# Patient Record
Sex: FEMALE | Race: OTHER | HISPANIC OR LATINO
[De-identification: names, ages, dates, MRNs, and addresses within clinical notes are randomized per-mention and may not be internally consistent; named-entity substitution may affect disease eponyms.]

---

## 2017-09-08 ENCOUNTER — APPOINTMENT (OUTPATIENT)
Dept: SURGERY | Facility: CLINIC | Age: 77
End: 2017-09-08

## 2017-09-15 ENCOUNTER — OTHER (OUTPATIENT)
Age: 77
End: 2017-09-15

## 2017-09-15 ENCOUNTER — APPOINTMENT (OUTPATIENT)
Dept: SURGERY | Facility: CLINIC | Age: 77
End: 2017-09-15
Payer: MEDICARE

## 2017-09-15 VITALS — HEIGHT: 64 IN | BODY MASS INDEX: 35.34 KG/M2 | WEIGHT: 207 LBS

## 2017-09-15 DIAGNOSIS — Z78.9 OTHER SPECIFIED HEALTH STATUS: ICD-10-CM

## 2017-09-15 DIAGNOSIS — F17.200 NICOTINE DEPENDENCE, UNSPECIFIED, UNCOMPLICATED: ICD-10-CM

## 2017-09-15 PROCEDURE — 99213 OFFICE O/P EST LOW 20 MIN: CPT

## 2017-10-06 VITALS
WEIGHT: 199.96 LBS | TEMPERATURE: 97 F | RESPIRATION RATE: 18 BRPM | HEIGHT: 64 IN | SYSTOLIC BLOOD PRESSURE: 182 MMHG | OXYGEN SATURATION: 97 % | DIASTOLIC BLOOD PRESSURE: 82 MMHG | HEART RATE: 65 BPM

## 2017-10-06 RX ORDER — INFLUENZA VIRUS VACCINE 15; 15; 15; 15 UG/.5ML; UG/.5ML; UG/.5ML; UG/.5ML
0.5 SUSPENSION INTRAMUSCULAR ONCE
Qty: 0 | Refills: 0 | Status: DISCONTINUED | OUTPATIENT
Start: 2017-10-09 | End: 2017-10-13

## 2017-10-06 RX ORDER — DICLOFENAC SODIUM 75 MG/1
0 TABLET, DELAYED RELEASE ORAL
Qty: 0 | Refills: 0 | COMMUNITY

## 2017-10-06 NOTE — PATIENT PROFILE ADULT. - PSH
S/P appendectomy    S/P     S/P cholecystectomy    S/P knee replacement  left  S/P tonsillectomy    Surgery, elective  Right shoulder replacement S/P appendectomy    S/P     S/P cholecystectomy    S/P knee replacement  left  S/P tonsillectomy    Surgery, elective  Right shoulder surgery

## 2017-10-09 ENCOUNTER — INPATIENT (INPATIENT)
Facility: HOSPITAL | Age: 77
LOS: 3 days | Discharge: HOME | DRG: 328 | End: 2017-10-13
Attending: SURGERY | Admitting: SURGERY
Payer: MEDICARE

## 2017-10-09 ENCOUNTER — RESULT REVIEW (OUTPATIENT)
Age: 77
End: 2017-10-09

## 2017-10-09 DIAGNOSIS — D3A.00 BENIGN CARCINOID TUMOR OF UNSPECIFIED SITE: ICD-10-CM

## 2017-10-09 DIAGNOSIS — Z96.659 PRESENCE OF UNSPECIFIED ARTIFICIAL KNEE JOINT: Chronic | ICD-10-CM

## 2017-10-09 DIAGNOSIS — Z41.9 ENCOUNTER FOR PROCEDURE FOR PURPOSES OTHER THAN REMEDYING HEALTH STATE, UNSPECIFIED: Chronic | ICD-10-CM

## 2017-10-09 DIAGNOSIS — Z90.49 ACQUIRED ABSENCE OF OTHER SPECIFIED PARTS OF DIGESTIVE TRACT: Chronic | ICD-10-CM

## 2017-10-09 DIAGNOSIS — Z98.891 HISTORY OF UTERINE SCAR FROM PREVIOUS SURGERY: Chronic | ICD-10-CM

## 2017-10-09 DIAGNOSIS — Z90.89 ACQUIRED ABSENCE OF OTHER ORGANS: Chronic | ICD-10-CM

## 2017-10-09 LAB
BASE EXCESS BLDA CALC-SCNC: -3.9 MMOL/L — LOW (ref -2–3)
CA-I BLDA-SCNC: 1.13 MMOL/L — SIGNIFICANT CHANGE UP (ref 1.12–1.3)
COHGB MFR BLDA: 0.5 % — SIGNIFICANT CHANGE UP
GAS PNL BLDA: SIGNIFICANT CHANGE UP
HCO3 BLDA-SCNC: 21 MMOL/L — SIGNIFICANT CHANGE UP (ref 21–28)
HCT VFR BLD CALC: 36.6 % — SIGNIFICANT CHANGE UP (ref 34.5–45)
HGB BLD-MCNC: 11.8 G/DL — SIGNIFICANT CHANGE UP (ref 11.5–15.5)
HGB BLDA-MCNC: 12.5 G/DL — SIGNIFICANT CHANGE UP (ref 11.5–15.5)
MCHC RBC-ENTMCNC: 30.2 PG — SIGNIFICANT CHANGE UP (ref 27–34)
MCHC RBC-ENTMCNC: 32.2 G/DL — SIGNIFICANT CHANGE UP (ref 32–36)
MCV RBC AUTO: 93.6 FL — SIGNIFICANT CHANGE UP (ref 80–100)
METHGB MFR BLDA: 0.2 % — SIGNIFICANT CHANGE UP
O2 CT VFR BLDA CALC: 18.1 ML/DL — SIGNIFICANT CHANGE UP (ref 15–23)
OXYHGB MFR BLDA: 99 % — SIGNIFICANT CHANGE UP (ref 94–100)
PCO2 BLDA: 40 MMHG — SIGNIFICANT CHANGE UP (ref 32–45)
PH BLDA: 7.35 — SIGNIFICANT CHANGE UP (ref 7.35–7.45)
PLATELET # BLD AUTO: 250 K/UL — SIGNIFICANT CHANGE UP (ref 150–400)
PO2 BLDA: 303 MMHG — HIGH (ref 83–108)
POTASSIUM BLDA-SCNC: 3.8 MMOL/L — SIGNIFICANT CHANGE UP (ref 3.5–4.9)
RBC # BLD: 3.91 M/UL — SIGNIFICANT CHANGE UP (ref 3.8–5.2)
RBC # FLD: 14.7 % — SIGNIFICANT CHANGE UP (ref 10.3–16.9)
SAO2 % BLDA: 100 % — SIGNIFICANT CHANGE UP (ref 95–100)
SODIUM BLDA-SCNC: 135 MMOL/L — LOW (ref 138–146)
WBC # BLD: 16.4 K/UL — HIGH (ref 3.8–10.5)
WBC # FLD AUTO: 16.4 K/UL — HIGH (ref 3.8–10.5)

## 2017-10-09 PROCEDURE — 43631 REMOVAL OF STOMACH PARTIAL: CPT | Mod: GC

## 2017-10-09 RX ORDER — PANTOPRAZOLE SODIUM 20 MG/1
40 TABLET, DELAYED RELEASE ORAL DAILY
Qty: 0 | Refills: 0 | Status: DISCONTINUED | OUTPATIENT
Start: 2017-10-09 | End: 2017-10-11

## 2017-10-09 RX ORDER — HYDROMORPHONE HYDROCHLORIDE 2 MG/ML
1 INJECTION INTRAMUSCULAR; INTRAVENOUS; SUBCUTANEOUS
Qty: 0 | Refills: 0 | Status: COMPLETED | OUTPATIENT
Start: 2017-10-09 | End: 2017-10-09

## 2017-10-09 RX ORDER — SCOPALAMINE 1 MG/3D
1.5 PATCH, EXTENDED RELEASE TRANSDERMAL ONCE
Qty: 0 | Refills: 0 | Status: COMPLETED | OUTPATIENT
Start: 2017-10-09 | End: 2017-10-09

## 2017-10-09 RX ORDER — LEVOTHYROXINE SODIUM 125 MCG
1 TABLET ORAL
Qty: 0 | Refills: 0 | COMMUNITY

## 2017-10-09 RX ORDER — ENOXAPARIN SODIUM 100 MG/ML
40 INJECTION SUBCUTANEOUS ONCE
Qty: 0 | Refills: 0 | Status: COMPLETED | OUTPATIENT
Start: 2017-10-09 | End: 2017-10-09

## 2017-10-09 RX ORDER — ACETAMINOPHEN 500 MG
1000 TABLET ORAL ONCE
Qty: 0 | Refills: 0 | Status: COMPLETED | OUTPATIENT
Start: 2017-10-10 | End: 2017-10-10

## 2017-10-09 RX ORDER — LEVOTHYROXINE SODIUM 125 MCG
62 TABLET ORAL DAILY
Qty: 0 | Refills: 0 | Status: DISCONTINUED | OUTPATIENT
Start: 2017-10-09 | End: 2017-10-11

## 2017-10-09 RX ORDER — HYDROMORPHONE HYDROCHLORIDE 2 MG/ML
0.5 INJECTION INTRAMUSCULAR; INTRAVENOUS; SUBCUTANEOUS EVERY 4 HOURS
Qty: 0 | Refills: 0 | Status: DISCONTINUED | OUTPATIENT
Start: 2017-10-09 | End: 2017-10-13

## 2017-10-09 RX ORDER — NEBIVOLOL HYDROCHLORIDE 5 MG/1
1 TABLET ORAL
Qty: 0 | Refills: 0 | COMMUNITY

## 2017-10-09 RX ORDER — SODIUM CHLORIDE 9 MG/ML
1000 INJECTION, SOLUTION INTRAVENOUS
Qty: 0 | Refills: 0 | Status: DISCONTINUED | OUTPATIENT
Start: 2017-10-09 | End: 2017-10-11

## 2017-10-09 RX ORDER — ONDANSETRON 8 MG/1
4 TABLET, FILM COATED ORAL EVERY 6 HOURS
Qty: 0 | Refills: 0 | Status: DISCONTINUED | OUTPATIENT
Start: 2017-10-09 | End: 2017-10-13

## 2017-10-09 RX ORDER — HYDRALAZINE HCL 50 MG
10 TABLET ORAL ONCE
Qty: 0 | Refills: 0 | Status: COMPLETED | OUTPATIENT
Start: 2017-10-09 | End: 2017-10-09

## 2017-10-09 RX ORDER — ENOXAPARIN SODIUM 100 MG/ML
40 INJECTION SUBCUTANEOUS
Qty: 0 | Refills: 0 | Status: DISCONTINUED | OUTPATIENT
Start: 2017-10-10 | End: 2017-10-13

## 2017-10-09 RX ORDER — ACETAMINOPHEN 500 MG
1000 TABLET ORAL ONCE
Qty: 0 | Refills: 0 | Status: COMPLETED | OUTPATIENT
Start: 2017-10-09 | End: 2017-10-09

## 2017-10-09 RX ORDER — SITAGLIPTIN 50 MG/1
1 TABLET, FILM COATED ORAL
Qty: 0 | Refills: 0 | COMMUNITY

## 2017-10-09 RX ORDER — LABETALOL HCL 100 MG
10 TABLET ORAL ONCE
Qty: 0 | Refills: 0 | Status: COMPLETED | OUTPATIENT
Start: 2017-10-09 | End: 2017-10-09

## 2017-10-09 RX ORDER — HYDROMORPHONE HYDROCHLORIDE 2 MG/ML
1 INJECTION INTRAMUSCULAR; INTRAVENOUS; SUBCUTANEOUS EVERY 4 HOURS
Qty: 0 | Refills: 0 | Status: DISCONTINUED | OUTPATIENT
Start: 2017-10-09 | End: 2017-10-13

## 2017-10-09 RX ORDER — ACETAMINOPHEN 500 MG
1000 TABLET ORAL ONCE
Qty: 0 | Refills: 0 | Status: COMPLETED | OUTPATIENT
Start: 2017-10-09 | End: 2017-10-10

## 2017-10-09 RX ADMIN — Medication 1000 MILLIGRAM(S): at 23:15

## 2017-10-09 RX ADMIN — SODIUM CHLORIDE 150 MILLILITER(S): 9 INJECTION, SOLUTION INTRAVENOUS at 17:33

## 2017-10-09 RX ADMIN — Medication 10 MILLIGRAM(S): at 18:40

## 2017-10-09 RX ADMIN — HYDROMORPHONE HYDROCHLORIDE 1 MILLIGRAM(S): 2 INJECTION INTRAMUSCULAR; INTRAVENOUS; SUBCUTANEOUS at 17:15

## 2017-10-09 RX ADMIN — Medication 400 MILLIGRAM(S): at 22:39

## 2017-10-09 RX ADMIN — SCOPALAMINE 1.5 MILLIGRAM(S): 1 PATCH, EXTENDED RELEASE TRANSDERMAL at 06:57

## 2017-10-09 RX ADMIN — HYDROMORPHONE HYDROCHLORIDE 1 MILLIGRAM(S): 2 INJECTION INTRAMUSCULAR; INTRAVENOUS; SUBCUTANEOUS at 17:00

## 2017-10-09 RX ADMIN — Medication 10 MILLIGRAM(S): at 16:15

## 2017-10-09 RX ADMIN — PANTOPRAZOLE SODIUM 40 MILLIGRAM(S): 20 TABLET, DELAYED RELEASE ORAL at 17:33

## 2017-10-09 RX ADMIN — ENOXAPARIN SODIUM 40 MILLIGRAM(S): 100 INJECTION SUBCUTANEOUS at 06:57

## 2017-10-09 NOTE — H&P ADULT - HISTORY OF PRESENT ILLNESS
Patient is an 77 yo F with PMH significant for DM, HTN, hypothyroidism, & recently diagnosed gastric carcinoid found on routine endoscopy, patient is currently asymptomatic. Work-up revealed a well differentiated gastric carcinoid approximately 1.2cm in diameter in gastric antrum.

## 2017-10-09 NOTE — CHART NOTE - NSCHARTNOTEFT_GEN_A_CORE
STATUS POST:  Subtotal gastrectomy    SUBJECTIVE: Pt seen in PACU, complain of pain but tolerable, no nausea/vomiting. no headache. no palpitations    Vital Signs Last 24 Hrs  T(C): 36.8 (09 Oct 2017 15:18), Max: 36.8 (09 Oct 2017 15:18)  T(F): 98.3 (09 Oct 2017 15:18), Max: 98.3 (09 Oct 2017 15:18)  HR: 68 (09 Oct 2017 17:30) (66 - 82)  BP: 161/83 (09 Oct 2017 17:30) (153/82 - 221/100)  BP(mean): 119 (09 Oct 2017 17:30) (119 - 148)  RR: 16 (09 Oct 2017 17:30) (16 - 31)  SpO2: 100% (09 Oct 2017 17:30) (98% - 100%)  I&O's Summary    09 Oct 2017 07:01  -  09 Oct 2017 17:55  --------------------------------------------------------  IN: 3800 mL / OUT: 875 mL / NET: 2925 mL      I&O's Detail    09 Oct 2017 07:01  -  09 Oct 2017 17:55  --------------------------------------------------------  IN:    lactated ringers.: 300 mL    Other: 3500 mL  Total IN: 3800 mL    OUT:    Bulb: 50 mL    Estimated Blood Loss: 100 mL    Indwelling Catheter - Urethral: 725 mL  Total OUT: 875 mL    Total NET: 2925 m          Physical exam : Abdomen is soft, non tender, distended,       A/P: 76y Female   - Diet: NPO/IVF  - Activity: OOTB/ISS  - Labs: 6hr post op CBC and AM labs  - Pain medication  - DVT ppx STATUS POST:  Subtotal gastrectomy    SUBJECTIVE: Pt seen in PACU, complain of pain but tolerable, no nausea/vomiting. no headache. no palpitations, drowsy    Vital Signs Last 24 Hrs  T(C): 36.8 (09 Oct 2017 15:18), Max: 36.8 (09 Oct 2017 15:18)  T(F): 98.3 (09 Oct 2017 15:18), Max: 98.3 (09 Oct 2017 15:18)  HR: 68 (09 Oct 2017 17:30) (66 - 82)  BP: 161/83 (09 Oct 2017 17:30) (153/82 - 221/100)  BP(mean): 119 (09 Oct 2017 17:30) (119 - 148)  RR: 16 (09 Oct 2017 17:30) (16 - 31)  SpO2: 100% (09 Oct 2017 17:30) (98% - 100%)  I&O's Summary    09 Oct 2017 07:01  -  09 Oct 2017 17:55  --------------------------------------------------------  IN: 3800 mL / OUT: 875 mL / NET: 2925 mL      I&O's Detail    09 Oct 2017 07:01  -  09 Oct 2017 17:55  --------------------------------------------------------  IN:    lactated ringers.: 300 mL    Other: 3500 mL  Total IN: 3800 mL    OUT:    Bulb: 50 mL    Estimated Blood Loss: 100 mL    Indwelling Catheter - Urethral: 725 mL  Total OUT: 875 mL    Total NET: 2925 m          Physical exam : Abdomen is soft, non tender, non distended, incisions C/D/I, BHANU drain intact      A/P: 76y Female   - Diet: NPO/IVF  - Activity: OOTB/ISS  - Labs: 6hr post op CBC and AM labs  - Pain medication  - DVT ppx  - UGI in AM

## 2017-10-09 NOTE — BRIEF OPERATIVE NOTE - PROCEDURE
<<-----Click on this checkbox to enter Procedure Lymphadenectomy, abdominal  10/09/2017  D2  Active  MACHO  Ventral hernia repair  10/09/2017    Active  AGODWIN  Hiatal hernia repair  10/09/2017    Active  AGODWIN  Subtotal gastrectomy  10/09/2017  Robotic assisted  Active  AGODWIN

## 2017-10-09 NOTE — BRIEF OPERATIVE NOTE - POST-OP DX
Carcinoid tumor of stomach  10/09/2017    Parth Lozano  Hiatal hernia  10/09/2017    Parth Lozano  Ventral hernia without obstruction or gangrene  10/09/2017    Parth Lozano

## 2017-10-09 NOTE — H&P ADULT - PROBLEM SELECTOR PLAN 1
- NPO/IVFs  - Lovenox 40mg operative DVT ppx  - Consent for subtotal gastrectomy to be obtained by Dr. Gasca at bedside

## 2017-10-09 NOTE — BRIEF OPERATIVE NOTE - OPERATION/FINDINGS
Patient underwent laparoscopic robotic assisted subtotal gastrectomy with a D2 lymphadenectomy and a RNY reconstruction. Stapled side-to-side functional end-to-end gastrojejunal anastomosis and stapled side-to-side functional end-to-end jejunal-jejunal anastomosis without and tension and excellent blood supply. Patient also underwent primary repair of hiatal & incisional hernias without and complications.

## 2017-10-09 NOTE — H&P ADULT - PSH
S/P appendectomy    S/P     S/P cholecystectomy    S/P knee replacement  left  S/P tonsillectomy    Surgery, elective  Right shoulder surgery

## 2017-10-10 LAB
ANION GAP SERPL CALC-SCNC: 11 MMOL/L — SIGNIFICANT CHANGE UP (ref 5–17)
BUN SERPL-MCNC: 11 MG/DL — SIGNIFICANT CHANGE UP (ref 7–23)
CALCIUM SERPL-MCNC: 8.8 MG/DL — SIGNIFICANT CHANGE UP (ref 8.4–10.5)
CHLORIDE SERPL-SCNC: 105 MMOL/L — SIGNIFICANT CHANGE UP (ref 96–108)
CO2 SERPL-SCNC: 26 MMOL/L — SIGNIFICANT CHANGE UP (ref 22–31)
CREAT SERPL-MCNC: 0.81 MG/DL — SIGNIFICANT CHANGE UP (ref 0.5–1.3)
GLUCOSE SERPL-MCNC: 95 MG/DL — SIGNIFICANT CHANGE UP (ref 70–99)
HCT VFR BLD CALC: 32.6 % — LOW (ref 34.5–45)
HGB BLD-MCNC: 10.3 G/DL — LOW (ref 11.5–15.5)
MAGNESIUM SERPL-MCNC: 1.9 MG/DL — SIGNIFICANT CHANGE UP (ref 1.6–2.6)
MCHC RBC-ENTMCNC: 29.7 PG — SIGNIFICANT CHANGE UP (ref 27–34)
MCHC RBC-ENTMCNC: 31.6 G/DL — LOW (ref 32–36)
MCV RBC AUTO: 93.9 FL — SIGNIFICANT CHANGE UP (ref 80–100)
PHOSPHATE SERPL-MCNC: 3.9 MG/DL — SIGNIFICANT CHANGE UP (ref 2.5–4.5)
PLATELET # BLD AUTO: 207 K/UL — SIGNIFICANT CHANGE UP (ref 150–400)
POTASSIUM SERPL-MCNC: 4.4 MMOL/L — SIGNIFICANT CHANGE UP (ref 3.5–5.3)
POTASSIUM SERPL-SCNC: 4.4 MMOL/L — SIGNIFICANT CHANGE UP (ref 3.5–5.3)
RBC # BLD: 3.47 M/UL — LOW (ref 3.8–5.2)
RBC # FLD: 14.7 % — SIGNIFICANT CHANGE UP (ref 10.3–16.9)
SODIUM SERPL-SCNC: 142 MMOL/L — SIGNIFICANT CHANGE UP (ref 135–145)
WBC # BLD: 14 K/UL — HIGH (ref 3.8–10.5)
WBC # FLD AUTO: 14 K/UL — HIGH (ref 3.8–10.5)

## 2017-10-10 PROCEDURE — 74241: CPT | Mod: 26

## 2017-10-10 RX ORDER — NEBIVOLOL HYDROCHLORIDE 5 MG/1
5 TABLET ORAL DAILY
Qty: 0 | Refills: 0 | Status: DISCONTINUED | OUTPATIENT
Start: 2017-10-10 | End: 2017-10-13

## 2017-10-10 RX ADMIN — Medication 1000 MILLIGRAM(S): at 12:23

## 2017-10-10 RX ADMIN — Medication 1000 MILLIGRAM(S): at 05:00

## 2017-10-10 RX ADMIN — HYDROMORPHONE HYDROCHLORIDE 1 MILLIGRAM(S): 2 INJECTION INTRAMUSCULAR; INTRAVENOUS; SUBCUTANEOUS at 22:09

## 2017-10-10 RX ADMIN — SODIUM CHLORIDE 150 MILLILITER(S): 9 INJECTION, SOLUTION INTRAVENOUS at 00:00

## 2017-10-10 RX ADMIN — ONDANSETRON 4 MILLIGRAM(S): 8 TABLET, FILM COATED ORAL at 16:19

## 2017-10-10 RX ADMIN — SODIUM CHLORIDE 150 MILLILITER(S): 9 INJECTION, SOLUTION INTRAVENOUS at 17:13

## 2017-10-10 RX ADMIN — NEBIVOLOL HYDROCHLORIDE 5 MILLIGRAM(S): 5 TABLET ORAL at 18:06

## 2017-10-10 RX ADMIN — ENOXAPARIN SODIUM 40 MILLIGRAM(S): 100 INJECTION SUBCUTANEOUS at 17:13

## 2017-10-10 RX ADMIN — HYDROMORPHONE HYDROCHLORIDE 1 MILLIGRAM(S): 2 INJECTION INTRAMUSCULAR; INTRAVENOUS; SUBCUTANEOUS at 17:20

## 2017-10-10 RX ADMIN — Medication 400 MILLIGRAM(S): at 11:41

## 2017-10-10 RX ADMIN — HYDROMORPHONE HYDROCHLORIDE 1 MILLIGRAM(S): 2 INJECTION INTRAMUSCULAR; INTRAVENOUS; SUBCUTANEOUS at 00:23

## 2017-10-10 RX ADMIN — PANTOPRAZOLE SODIUM 40 MILLIGRAM(S): 20 TABLET, DELAYED RELEASE ORAL at 11:41

## 2017-10-10 RX ADMIN — HYDROMORPHONE HYDROCHLORIDE 1 MILLIGRAM(S): 2 INJECTION INTRAMUSCULAR; INTRAVENOUS; SUBCUTANEOUS at 07:12

## 2017-10-10 RX ADMIN — Medication 62 MICROGRAM(S): at 08:10

## 2017-10-10 RX ADMIN — HYDROMORPHONE HYDROCHLORIDE 1 MILLIGRAM(S): 2 INJECTION INTRAMUSCULAR; INTRAVENOUS; SUBCUTANEOUS at 22:24

## 2017-10-10 RX ADMIN — HYDROMORPHONE HYDROCHLORIDE 1 MILLIGRAM(S): 2 INJECTION INTRAMUSCULAR; INTRAVENOUS; SUBCUTANEOUS at 01:31

## 2017-10-10 RX ADMIN — HYDROMORPHONE HYDROCHLORIDE 1 MILLIGRAM(S): 2 INJECTION INTRAMUSCULAR; INTRAVENOUS; SUBCUTANEOUS at 18:00

## 2017-10-10 RX ADMIN — HYDROMORPHONE HYDROCHLORIDE 1 MILLIGRAM(S): 2 INJECTION INTRAMUSCULAR; INTRAVENOUS; SUBCUTANEOUS at 06:10

## 2017-10-10 RX ADMIN — Medication 400 MILLIGRAM(S): at 04:00

## 2017-10-10 NOTE — DIETITIAN INITIAL EVALUATION ADULT. - ENERGY NEEDS
Height: 5'4" Weight: 200lbs, IBW 120lbs+/-10%, %%, BMI 34.4  IBW used for calculations as pt >120% of IBW   Needs adjusted 2/2 age and surgical incision

## 2017-10-10 NOTE — PROGRESS NOTE ADULT - SUBJECTIVE AND OBJECTIVE BOX
OVERNIGHT EVENTS: postop Hb 11.8, desat to 88 on RA, placed on 2L NC. VSS   10/9 : Underwent lap robotic assisted subtotal gastrectomy with hiatal and ventral hernia repair, and lymphadenectomy. POC WNL    STATUS POST:lap robotic assisted subtotal gastrectomy with RYGB reconstruction, lympadenectomy, hiatal hernia repair and incisional hernia repair.      POST OPERATIVE DAY #:  1    SUBJECTIVE: Patient complains of incisional pain  Flatus: [] YES [X] NO             Bowel Movement: [ ] YES [X ] NO  Pain (0-10):            Pain Control Adequate: [X ] YES [ ] NO  Nausea: [ ] YES [X ] NO            Vomiting: [ ] YES [X ] NO  Diarrhea: [ ] YES [X ] NO         Constipation: [ ] YES [X ] NO     Chest Pain: [ ] YES [X ] NO    SOB:  [ ] YES [X ] NO    enoxaparin Injectable 40 milliGRAM(s) SubCutaneous two times a day      Vital Signs Last 24 Hrs  T(C): 36.5 (10 Oct 2017 09:33), Max: 37.3 (10 Oct 2017 00:12)  T(F): 97.7 (10 Oct 2017 09:33), Max: 99.1 (10 Oct 2017 00:12)  HR: 63 (10 Oct 2017 09:33) (63 - 82)  BP: 136/81 (10 Oct 2017 09:33) (106/64 - 229/100)  BP(mean): 114 (09 Oct 2017 19:00) (114 - 148)  RR: 16 (10 Oct 2017 09:33) (13 - 31)  SpO2: 97% (10 Oct 2017 09:33) (92% - 100%)  I&O's Detail    09 Oct 2017 07:01  -  10 Oct 2017 07:00  --------------------------------------------------------  IN:    lactated ringers.: 2375 mL    Other: 3500 mL  Total IN: 5875 mL    OUT:    Bulb: 110 mL    Estimated Blood Loss: 100 mL    Indwelling Catheter - Urethral: 2175 mL  Total OUT: 2385 mL    Total NET: 3490 mL          General: NAD, resting comfortably in bed  C/V: NSR  Pulm: Nonlabored breathing, no respiratory distress  Abd: Soft , non-distended , TTP around incision site , incision clean dry and intact  Extrem: WWP, no edema, SCDs in place        LABS:                        10.3   14.0  )-----------( 207      ( 10 Oct 2017 07:55 )             32.6     10-10    142  |  105  |  11  ----------------------------<  95  4.4   |  26  |  0.81    Ca    8.8      10 Oct 2017 07:55  Phos  3.9     10-10  Mg     1.9     10-10            RADIOLOGY & ADDITIONAL STUDIES:

## 2017-10-10 NOTE — PROGRESS NOTE ADULT - ASSESSMENT
75 yo F, pmh hypothyroidsm, HTN, MO, HTN, DM and gastric carcinoid tumor. s/p  lap robotic assisted subtotal gastrectomy with RYGB reconstruction, lympadenectomy, hiatal hernia repair and incisional hernia repair. -10/9    NPO/IVF  UGI in AM  AM labs   Pain and nausea control - IV tylenol, dilaudid, zofran  DVT prophylaxis  OOTB/ISS  I/O

## 2017-10-10 NOTE — DIETITIAN INITIAL EVALUATION ADULT. - OTHER INFO
77yo F recently diagnosed w/ carcinoid found on routine endoscopy. S/p subtotal gastrectomy w/ hiatal and ventral hernia repair, and lymphadenectomy POD1. Pt NPO at time of assessment, now on BARICLLIQ diet. Pt lethargic at time of assessment, information obtained from daughter and son. Family reports pt was nauseas after surgery, which has since resolved. Reviewed diet advancement process and nutrition s/p gastrectomy. Pain controlled. NKFA or dietary restrictions. Skin: surgical incision, GI WDL per flowsheet.

## 2017-10-10 NOTE — DIETITIAN INITIAL EVALUATION ADULT. - NS AS NUTRI INTERV ED CONTENT
Purpose of the nutrition education/Educated on diet progression for subtotal gastrectomy, importance of adequate protein, hydration, and compliance with vitamin supplements.  Pt expressed positive, verbal understanding; expected compliance is good.

## 2017-10-11 ENCOUNTER — RESULT REVIEW (OUTPATIENT)
Age: 77
End: 2017-10-11

## 2017-10-11 LAB
ANION GAP SERPL CALC-SCNC: 11 MMOL/L — SIGNIFICANT CHANGE UP (ref 5–17)
BUN SERPL-MCNC: 8 MG/DL — SIGNIFICANT CHANGE UP (ref 7–23)
CALCIUM SERPL-MCNC: 9 MG/DL — SIGNIFICANT CHANGE UP (ref 8.4–10.5)
CHLORIDE SERPL-SCNC: 102 MMOL/L — SIGNIFICANT CHANGE UP (ref 96–108)
CO2 SERPL-SCNC: 28 MMOL/L — SIGNIFICANT CHANGE UP (ref 22–31)
CREAT SERPL-MCNC: 0.65 MG/DL — SIGNIFICANT CHANGE UP (ref 0.5–1.3)
GLUCOSE SERPL-MCNC: 94 MG/DL — SIGNIFICANT CHANGE UP (ref 70–99)
HCT VFR BLD CALC: 32.9 % — LOW (ref 34.5–45)
HGB BLD-MCNC: 10.5 G/DL — LOW (ref 11.5–15.5)
MAGNESIUM SERPL-MCNC: 1.8 MG/DL — SIGNIFICANT CHANGE UP (ref 1.6–2.6)
MCHC RBC-ENTMCNC: 30.4 PG — SIGNIFICANT CHANGE UP (ref 27–34)
MCHC RBC-ENTMCNC: 31.9 G/DL — LOW (ref 32–36)
MCV RBC AUTO: 95.4 FL — SIGNIFICANT CHANGE UP (ref 80–100)
PHOSPHATE SERPL-MCNC: 1.8 MG/DL — LOW (ref 2.5–4.5)
PLATELET # BLD AUTO: 207 K/UL — SIGNIFICANT CHANGE UP (ref 150–400)
POTASSIUM SERPL-MCNC: 4.2 MMOL/L — SIGNIFICANT CHANGE UP (ref 3.5–5.3)
POTASSIUM SERPL-SCNC: 4.2 MMOL/L — SIGNIFICANT CHANGE UP (ref 3.5–5.3)
RBC # BLD: 3.45 M/UL — LOW (ref 3.8–5.2)
RBC # FLD: 14.8 % — SIGNIFICANT CHANGE UP (ref 10.3–16.9)
SODIUM SERPL-SCNC: 141 MMOL/L — SIGNIFICANT CHANGE UP (ref 135–145)
WBC # BLD: 11.8 K/UL — HIGH (ref 3.8–10.5)
WBC # FLD AUTO: 11.8 K/UL — HIGH (ref 3.8–10.5)

## 2017-10-11 RX ORDER — PANTOPRAZOLE SODIUM 20 MG/1
40 TABLET, DELAYED RELEASE ORAL DAILY
Qty: 0 | Refills: 0 | Status: DISCONTINUED | OUTPATIENT
Start: 2017-10-11 | End: 2017-10-13

## 2017-10-11 RX ORDER — POTASSIUM PHOSPHATE, MONOBASIC POTASSIUM PHOSPHATE, DIBASIC 236; 224 MG/ML; MG/ML
21 INJECTION, SOLUTION INTRAVENOUS ONCE
Qty: 0 | Refills: 0 | Status: COMPLETED | OUTPATIENT
Start: 2017-10-11 | End: 2017-10-11

## 2017-10-11 RX ORDER — LEVOTHYROXINE SODIUM 125 MCG
125 TABLET ORAL DAILY
Qty: 0 | Refills: 0 | Status: DISCONTINUED | OUTPATIENT
Start: 2017-10-12 | End: 2017-10-13

## 2017-10-11 RX ADMIN — SODIUM CHLORIDE 150 MILLILITER(S): 9 INJECTION, SOLUTION INTRAVENOUS at 22:11

## 2017-10-11 RX ADMIN — PANTOPRAZOLE SODIUM 40 MILLIGRAM(S): 20 TABLET, DELAYED RELEASE ORAL at 11:37

## 2017-10-11 RX ADMIN — SODIUM CHLORIDE 150 MILLILITER(S): 9 INJECTION, SOLUTION INTRAVENOUS at 00:13

## 2017-10-11 RX ADMIN — NEBIVOLOL HYDROCHLORIDE 5 MILLIGRAM(S): 5 TABLET ORAL at 08:06

## 2017-10-11 RX ADMIN — ONDANSETRON 4 MILLIGRAM(S): 8 TABLET, FILM COATED ORAL at 16:28

## 2017-10-11 RX ADMIN — POTASSIUM PHOSPHATE, MONOBASIC POTASSIUM PHOSPHATE, DIBASIC 64.25 MILLIMOLE(S): 236; 224 INJECTION, SOLUTION INTRAVENOUS at 13:28

## 2017-10-11 RX ADMIN — Medication 62 MICROGRAM(S): at 09:59

## 2017-10-11 RX ADMIN — ENOXAPARIN SODIUM 40 MILLIGRAM(S): 100 INJECTION SUBCUTANEOUS at 17:06

## 2017-10-11 RX ADMIN — ENOXAPARIN SODIUM 40 MILLIGRAM(S): 100 INJECTION SUBCUTANEOUS at 06:46

## 2017-10-11 NOTE — PROGRESS NOTE ADULT - ASSESSMENT
75 yo F, pmh hypothyroidsm, HTN, MO, HTN, DM and gastric carcinoid tumor. s/p  lap robotic assisted subtotal gastrectomy with RYGB reconstruction, lympadenectomy, hiatal hernia repair and incisional hernia repair. -10/9    AM labs   Pain and nausea control - IV tylenol, dilaudid, zofran  DVT prophylaxis  OOTB/ISS  I/O

## 2017-10-11 NOTE — PROGRESS NOTE ADULT - SUBJECTIVE AND OBJECTIVE BOX
O/N: VSS, CARRIE, OOB/C,   10/10 : AM hb : 10.3 , not tachycardic, hypertensive to 170, started her home meds, UGI WNL There is delayed passage of contrast into proximal loops of small bowel. Delayed images demonstrate passage into unremarkable loops of small bowel. There is no evidence of leakage, obstruction or other significant abnormalities. Passed TOV  started on CLD OVERNIGHT EVENTS: VSS, CARRIE, OOB/C,   10/10 : AM hb : 10.3 , not tachycardic, hypertensive to 170, started her home meds, UGI WNL There is delayed passage of contrast into proximal loops of small bowel. Delayed images demonstrate passage into unremarkable loops of small bowel. There is no evidence of leakage, obstruction or other significant abnormalities. Passed TOV  started on CLD     STATUS POST:  lap robotic assisted subtotal gastrectomy with RYGB reconstruction, lympadenectomy, hiatal hernia repair and incisional hernia repair.    POST OPERATIVE DAY #:  2    SUBJECTIVE: Patient complains of incisional pain    Flatus: [] YES [X] NO             Bowel Movement: [ ] YES [X ] NO  Pain (0-10):            Pain Control Adequate: [ X] YES [ ] NO  Nausea: [ ] YES [X ] NO            Vomiting: [ ] YES [X ] NO  Diarrhea: [ ] YES [X ] NO         Constipation: [ ] YES [ X] NO     Chest Pain: [ ] YES [X ] NO    SOB:  [ ] YES [X ] NO    enoxaparin Injectable 40 milliGRAM(s) SubCutaneous two times a day  nebivolol 5 milliGRAM(s) Oral daily      Vital Signs Last 24 Hrs  T(C): 37.2 (11 Oct 2017 10:22), Max: 37.4 (11 Oct 2017 05:43)  T(F): 98.9 (11 Oct 2017 10:22), Max: 99.3 (11 Oct 2017 05:43)  HR: 72 (11 Oct 2017 10:22) (70 - 88)  BP: 163/74 (11 Oct 2017 10:22) (136/74 - 181/80)  BP(mean): --  RR: 17 (11 Oct 2017 10:22) (16 - 17)  SpO2: 97% (11 Oct 2017 10:22) (93% - 99%)  I&O's Detail    10 Oct 2017 07:01  -  11 Oct 2017 07:00  --------------------------------------------------------  IN:    lactated ringers.: 300 mL    Oral Fluid: 270 mL    Solution: 1900 mL  Total IN: 2470 mL    OUT:    Bulb: 158 mL    Indwelling Catheter - Urethral: 1875 mL    Voided: 2500 mL  Total OUT: 4533 mL    Total NET: -2063 mL      11 Oct 2017 07:01  -  11 Oct 2017 12:01  --------------------------------------------------------  IN:    Oral Fluid: 360 mL    Solution: 150 mL  Total IN: 510 mL    OUT:    Voided: 500 mL  Total OUT: 500 mL    Total NET: 10 mL          General: NAD, resting comfortably in bed  C/V: NSR  Pulm: Nonlabored breathing, no respiratory distress  Abd:Soft , non-distended,  TTP around incision site , incision clean dry and intact  Extrem: WWP, no edema, SCDs in place  Drains: Serosanguineous fluid         LABS:                        10.5   11.8  )-----------( 207      ( 11 Oct 2017 06:44 )             32.9     10-11    141  |  102  |  8   ----------------------------<  94  4.2   |  28  |  0.65    Ca    9.0      11 Oct 2017 06:44  Phos  1.8     10-11  Mg     1.8     10-11

## 2017-10-12 LAB
ANION GAP SERPL CALC-SCNC: 14 MMOL/L — SIGNIFICANT CHANGE UP (ref 5–17)
BUN SERPL-MCNC: 10 MG/DL — SIGNIFICANT CHANGE UP (ref 7–23)
CALCIUM SERPL-MCNC: 9.3 MG/DL — SIGNIFICANT CHANGE UP (ref 8.4–10.5)
CHLORIDE SERPL-SCNC: 97 MMOL/L — SIGNIFICANT CHANGE UP (ref 96–108)
CO2 SERPL-SCNC: 26 MMOL/L — SIGNIFICANT CHANGE UP (ref 22–31)
CREAT SERPL-MCNC: 0.72 MG/DL — SIGNIFICANT CHANGE UP (ref 0.5–1.3)
GLUCOSE SERPL-MCNC: 84 MG/DL — SIGNIFICANT CHANGE UP (ref 70–99)
HCT VFR BLD CALC: 36.6 % — SIGNIFICANT CHANGE UP (ref 34.5–45)
HGB BLD-MCNC: 11.6 G/DL — SIGNIFICANT CHANGE UP (ref 11.5–15.5)
MAGNESIUM SERPL-MCNC: 1.8 MG/DL — SIGNIFICANT CHANGE UP (ref 1.6–2.6)
MCHC RBC-ENTMCNC: 29.9 PG — SIGNIFICANT CHANGE UP (ref 27–34)
MCHC RBC-ENTMCNC: 31.7 G/DL — LOW (ref 32–36)
MCV RBC AUTO: 94.3 FL — SIGNIFICANT CHANGE UP (ref 80–100)
PHOSPHATE SERPL-MCNC: 2.8 MG/DL — SIGNIFICANT CHANGE UP (ref 2.5–4.5)
PLATELET # BLD AUTO: 216 K/UL — SIGNIFICANT CHANGE UP (ref 150–400)
POTASSIUM SERPL-MCNC: 3.8 MMOL/L — SIGNIFICANT CHANGE UP (ref 3.5–5.3)
POTASSIUM SERPL-SCNC: 3.8 MMOL/L — SIGNIFICANT CHANGE UP (ref 3.5–5.3)
RBC # BLD: 3.88 M/UL — SIGNIFICANT CHANGE UP (ref 3.8–5.2)
RBC # FLD: 14.4 % — SIGNIFICANT CHANGE UP (ref 10.3–16.9)
SODIUM SERPL-SCNC: 137 MMOL/L — SIGNIFICANT CHANGE UP (ref 135–145)
SURGICAL PATHOLOGY STUDY: SIGNIFICANT CHANGE UP
SURGICAL PATHOLOGY STUDY: SIGNIFICANT CHANGE UP
WBC # BLD: 12.2 K/UL — HIGH (ref 3.8–10.5)
WBC # FLD AUTO: 12.2 K/UL — HIGH (ref 3.8–10.5)

## 2017-10-12 RX ORDER — SODIUM CHLORIDE 0.65 %
1 AEROSOL, SPRAY (ML) NASAL ONCE
Qty: 0 | Refills: 0 | Status: COMPLETED | OUTPATIENT
Start: 2017-10-12 | End: 2017-10-13

## 2017-10-12 RX ORDER — SIMETHICONE 80 MG/1
80 TABLET, CHEWABLE ORAL ONCE
Qty: 0 | Refills: 0 | Status: COMPLETED | OUTPATIENT
Start: 2017-10-12 | End: 2017-10-12

## 2017-10-12 RX ADMIN — HYDROMORPHONE HYDROCHLORIDE 1 MILLIGRAM(S): 2 INJECTION INTRAMUSCULAR; INTRAVENOUS; SUBCUTANEOUS at 01:52

## 2017-10-12 RX ADMIN — ENOXAPARIN SODIUM 40 MILLIGRAM(S): 100 INJECTION SUBCUTANEOUS at 07:08

## 2017-10-12 RX ADMIN — PANTOPRAZOLE SODIUM 40 MILLIGRAM(S): 20 TABLET, DELAYED RELEASE ORAL at 12:10

## 2017-10-12 RX ADMIN — NEBIVOLOL HYDROCHLORIDE 5 MILLIGRAM(S): 5 TABLET ORAL at 06:33

## 2017-10-12 RX ADMIN — ENOXAPARIN SODIUM 40 MILLIGRAM(S): 100 INJECTION SUBCUTANEOUS at 17:45

## 2017-10-12 RX ADMIN — SCOPALAMINE 1.5 MILLIGRAM(S): 1 PATCH, EXTENDED RELEASE TRANSDERMAL at 06:34

## 2017-10-12 RX ADMIN — HYDROMORPHONE HYDROCHLORIDE 1 MILLIGRAM(S): 2 INJECTION INTRAMUSCULAR; INTRAVENOUS; SUBCUTANEOUS at 02:27

## 2017-10-12 RX ADMIN — Medication 125 MICROGRAM(S): at 06:33

## 2017-10-12 RX ADMIN — SIMETHICONE 80 MILLIGRAM(S): 80 TABLET, CHEWABLE ORAL at 03:01

## 2017-10-12 NOTE — PROGRESS NOTE ADULT - ASSESSMENT
73 yo F, pmh hypothyroidsm, HTN, MO, HTN, DM and gastric carcinoid tumor. s/p  lap robotic assisted subtotal gastrectomy with RYGB reconstruction, lympadenectomy, hiatal hernia repair and incisional hernia repair. -10/9  BCLD/IVF  Pain and nausea control - IV tylenol, dilaudid, zofran  DVT prophylaxis  OOTB/IS  SCDs/SQH  AM labs 73 yo F, pmh hypothyroidsm, HTN, MO, HTN, DM and gastric carcinoid tumor. s/p  lap robotic assisted subtotal gastrectomy with RYGB reconstruction, lympadenectomy, hiatal hernia repair and incisional hernia repair. -10/9      BCLD/IVF  Pain and nausea control - IV tylenol, dilaudid, zofran  DVT prophylaxis  OOTB/IS  SCDs/SQH  AM labs

## 2017-10-12 NOTE — PROGRESS NOTE ADULT - SUBJECTIVE AND OBJECTIVE BOX
O/N: CARRIE, VSS, OOBA, tolerating PO  10/11: Pain controlled , tolerating diet, encourage to ambulate and use incentive spirometer     STATUS POST: lap robotic assisted subtotal gastrectomy with RYGB reconstruction, lympadenectomy, hiatal hernia repair and incisional hernia repair    POD# 3 O/N: CARRIE, VSS, OOBA, tolerating PO  10/11: Pain controlled , tolerating diet, encourage to ambulate and use incentive spirometer     STATUS POST: lap robotic assisted subtotal gastrectomy with RYGB reconstruction, lympadenectomy, hiatal hernia repair and incisional hernia repair    POD# 3       SUBJECTIVE: Patient seen and examined bedside by chief resident. Complaining of headache, ambulating, nausea but no vomiting. Tolerating with CLD. No other complains.    enoxaparin Injectable 40 milliGRAM(s) SubCutaneous two times a day  nebivolol 5 milliGRAM(s) Oral daily      Vital Signs Last 24 Hrs  T(C): 36.1 (12 Oct 2017 05:39), Max: 37.6 (11 Oct 2017 21:50)  T(F): 97 (12 Oct 2017 05:39), Max: 99.7 (11 Oct 2017 21:50)  HR: 71 (12 Oct 2017 05:39) (71 - 83)  BP: 142/76 (12 Oct 2017 05:39) (142/76 - 165/86)  BP(mean): --  RR: 16 (12 Oct 2017 05:39) (16 - 17)  SpO2: 95% (12 Oct 2017 05:39) (95% - 97%)  I&O's Detail    11 Oct 2017 07:01  -  12 Oct 2017 07:00  --------------------------------------------------------  IN:    Oral Fluid: 540 mL    Solution: 150 mL  Total IN: 690 mL    OUT:    Bulb: 85 mL    Voided: 3700 mL  Total OUT: 3785 mL    Total NET: -3095 mL          General: NAD, resting comfortably in bed  C/V: NSR  Pulm: Nonlabored breathing, no respiratory distress  Abd: soft, tender (mild) around the epigastric area, incisions C/D/I, BHANU drain intact, minimal serosanguinous output  Extrem: WWP, no edema, SCDs in place        LABS:                        10.5   11.8  )-----------( 207      ( 11 Oct 2017 06:44 )             32.9     10-11    141  |  102  |  8   ----------------------------<  94  4.2   |  28  |  0.65    Ca    9.0      11 Oct 2017 06:44  Phos  1.8     10-11  Mg     1.8     10-11            RADIOLOGY & ADDITIONAL STUDIES:

## 2017-10-13 ENCOUNTER — TRANSCRIPTION ENCOUNTER (OUTPATIENT)
Age: 77
End: 2017-10-13

## 2017-10-13 VITALS
HEART RATE: 69 BPM | OXYGEN SATURATION: 95 % | SYSTOLIC BLOOD PRESSURE: 154 MMHG | DIASTOLIC BLOOD PRESSURE: 85 MMHG | TEMPERATURE: 98 F | RESPIRATION RATE: 16 BRPM

## 2017-10-13 LAB
ANION GAP SERPL CALC-SCNC: 16 MMOL/L — SIGNIFICANT CHANGE UP (ref 5–17)
BUN SERPL-MCNC: 12 MG/DL — SIGNIFICANT CHANGE UP (ref 7–23)
CALCIUM SERPL-MCNC: 9.4 MG/DL — SIGNIFICANT CHANGE UP (ref 8.4–10.5)
CHLORIDE SERPL-SCNC: 99 MMOL/L — SIGNIFICANT CHANGE UP (ref 96–108)
CO2 SERPL-SCNC: 26 MMOL/L — SIGNIFICANT CHANGE UP (ref 22–31)
CREAT SERPL-MCNC: 0.68 MG/DL — SIGNIFICANT CHANGE UP (ref 0.5–1.3)
GLUCOSE SERPL-MCNC: 82 MG/DL — SIGNIFICANT CHANGE UP (ref 70–99)
HCT VFR BLD CALC: 37.1 % — SIGNIFICANT CHANGE UP (ref 34.5–45)
HGB BLD-MCNC: 12.3 G/DL — SIGNIFICANT CHANGE UP (ref 11.5–15.5)
MAGNESIUM SERPL-MCNC: 1.9 MG/DL — SIGNIFICANT CHANGE UP (ref 1.6–2.6)
MCHC RBC-ENTMCNC: 30.4 PG — SIGNIFICANT CHANGE UP (ref 27–34)
MCHC RBC-ENTMCNC: 33.2 G/DL — SIGNIFICANT CHANGE UP (ref 32–36)
MCV RBC AUTO: 91.8 FL — SIGNIFICANT CHANGE UP (ref 80–100)
PHOSPHATE SERPL-MCNC: 2.7 MG/DL — SIGNIFICANT CHANGE UP (ref 2.5–4.5)
PLATELET # BLD AUTO: 278 K/UL — SIGNIFICANT CHANGE UP (ref 150–400)
POTASSIUM SERPL-MCNC: 3.5 MMOL/L — SIGNIFICANT CHANGE UP (ref 3.5–5.3)
POTASSIUM SERPL-SCNC: 3.5 MMOL/L — SIGNIFICANT CHANGE UP (ref 3.5–5.3)
RBC # BLD: 4.04 M/UL — SIGNIFICANT CHANGE UP (ref 3.8–5.2)
RBC # FLD: 14.7 % — SIGNIFICANT CHANGE UP (ref 10.3–16.9)
SODIUM SERPL-SCNC: 141 MMOL/L — SIGNIFICANT CHANGE UP (ref 135–145)
WBC # BLD: 10.5 K/UL — SIGNIFICANT CHANGE UP (ref 3.8–10.5)
WBC # FLD AUTO: 10.5 K/UL — SIGNIFICANT CHANGE UP (ref 3.8–10.5)

## 2017-10-13 RX ORDER — POTASSIUM CHLORIDE 20 MEQ
40 PACKET (EA) ORAL ONCE
Qty: 0 | Refills: 0 | Status: COMPLETED | OUTPATIENT
Start: 2017-10-13 | End: 2017-10-13

## 2017-10-13 RX ORDER — DICLOFENAC SODIUM 75 MG/1
10 TABLET, DELAYED RELEASE ORAL
Qty: 0 | Refills: 0 | COMMUNITY

## 2017-10-13 RX ORDER — PANTOPRAZOLE SODIUM 20 MG/1
1 TABLET, DELAYED RELEASE ORAL
Qty: 20 | Refills: 0 | OUTPATIENT
Start: 2017-10-13 | End: 2017-11-02

## 2017-10-13 RX ORDER — DOCUSATE SODIUM 100 MG
1 CAPSULE ORAL
Qty: 20 | Refills: 0 | OUTPATIENT
Start: 2017-10-13 | End: 2017-10-23

## 2017-10-13 RX ORDER — MAGNESIUM SULFATE 500 MG/ML
1 VIAL (ML) INJECTION ONCE
Qty: 0 | Refills: 0 | Status: COMPLETED | OUTPATIENT
Start: 2017-10-13 | End: 2017-10-13

## 2017-10-13 RX ORDER — POTASSIUM PHOSPHATE, MONOBASIC POTASSIUM PHOSPHATE, DIBASIC 236; 224 MG/ML; MG/ML
15 INJECTION, SOLUTION INTRAVENOUS ONCE
Qty: 0 | Refills: 0 | Status: DISCONTINUED | OUTPATIENT
Start: 2017-10-13 | End: 2017-10-13

## 2017-10-13 RX ADMIN — Medication 100 GRAM(S): at 14:50

## 2017-10-13 RX ADMIN — Medication 1 SPRAY(S): at 00:37

## 2017-10-13 RX ADMIN — NEBIVOLOL HYDROCHLORIDE 5 MILLIGRAM(S): 5 TABLET ORAL at 05:16

## 2017-10-13 RX ADMIN — ENOXAPARIN SODIUM 40 MILLIGRAM(S): 100 INJECTION SUBCUTANEOUS at 05:15

## 2017-10-13 RX ADMIN — PANTOPRAZOLE SODIUM 40 MILLIGRAM(S): 20 TABLET, DELAYED RELEASE ORAL at 14:50

## 2017-10-13 RX ADMIN — Medication 125 MICROGRAM(S): at 05:16

## 2017-10-13 RX ADMIN — Medication 40 MILLIEQUIVALENT(S): at 14:49

## 2017-10-13 NOTE — PROGRESS NOTE ADULT - SUBJECTIVE AND OBJECTIVE BOX
O/N: CARRIE, VSS  10/12: AMBULATING , tolerating diet , pain controlled    STATUS POST: lap robotic assisted subtotal gastrectomy with RYGB reconstruction, lympadenectomy, hiatal hernia repair and incisional hernia repair    POD# 4 O/N: CARRIE, VSS  10/12: AMBULATING , tolerating diet , pain controlled    STATUS POST: lap robotic assisted subtotal gastrectomy with RYGB reconstruction, lympadenectomy, hiatal hernia repair and incisional hernia repair    POD# 4     SUBJECTIVE: Patient seen and examined bedside by chief resident. Ambulating, no nausea/vomiting, no abdominal pain.    enoxaparin Injectable 40 milliGRAM(s) SubCutaneous two times a day  nebivolol 5 milliGRAM(s) Oral daily      Vital Signs Last 24 Hrs  T(C): 36.2 (13 Oct 2017 06:21), Max: 37.5 (12 Oct 2017 20:30)  T(F): 97.2 (13 Oct 2017 06:21), Max: 99.5 (12 Oct 2017 20:30)  HR: 71 (13 Oct 2017 06:21) (71 - 88)  BP: 139/84 (13 Oct 2017 06:21) (120/80 - 146/85)  BP(mean): --  RR: 16 (13 Oct 2017 06:21) (16 - 17)  SpO2: 95% (13 Oct 2017 06:21) (94% - 100%)  I&O's Detail    12 Oct 2017 07:01  -  13 Oct 2017 07:00  --------------------------------------------------------  IN:    Oral Fluid: 380 mL  Total IN: 380 mL    OUT:    Bulb: 82.5 mL    Voided: 1600 mL  Total OUT: 1682.5 mL    Total NET: -1302.5 mL          General: NAD, resting comfortably in bed  C/V: NSR  Pulm: Nonlabored breathing, no respiratory distress  Abd: soft, mild tenderness over the epigastrium, incisions C/D/I  Extrem: WWP, no edema, SCDs in place        LABS:                        12.3   10.5  )-----------( 278      ( 13 Oct 2017 06:36 )             37.1     10-13    141  |  99  |  12  ----------------------------<  82  3.5   |  26  |  0.68    Ca    9.4      13 Oct 2017 06:36  Phos  2.7     10-13  Mg     1.9     10-13            RADIOLOGY & ADDITIONAL STUDIES:

## 2017-10-13 NOTE — DISCHARGE NOTE ADULT - PATIENT PORTAL LINK FT
“You can access the [employer] Patient Portal, offered by [facility], by registering with the following website: [email or web address]”

## 2017-10-13 NOTE — DISCHARGE NOTE ADULT - MEDICATION SUMMARY - MEDICATIONS TO STOP TAKING
I will STOP taking the medications listed below when I get home from the hospital:    diclofenac  -- 10  mg by mouth once a day

## 2017-10-13 NOTE — DISCHARGE NOTE ADULT - PLAN OF CARE
Recovery Please resume all regular home medications besides ibuprofen or NSAIDs. Also, please take any new medications as prescribed - Percocet for pain, protonix for reflux. It will be prescribed to your preferred pharmacy.  Please get plenty of rest, continue to ambulate several times per day, and drink adequate amounts of fluids. Avoid lifting weights greater than 5-10 lbs until you follow-up with your surgeon, who will instruct you further regarding activity restrictions.  Avoid driving or operating heavy machinery while taking pain medications.  Please follow-up with your surgeon,  in 1-2 weeks. Contact information will be attached below.  Incision Care:  *Please call your doctor or nurse practitioner if you have increased pain, swelling, redness, or drainage from the incision site.  *You may shower, and wash surgical incisions with a mild soap and warm water. Gently pat the area dry. Warning signs WARNING SIGNS:  Please call your doctor or nurse practitioner if you experience the following:  *You experience new chest pain, pressure, squeezing or tightness.  *New or worsening cough, shortness of breath, or wheeze.  *If you are vomiting and cannot keep down fluids or your medications.  *You are getting dehydrated due to continued vomiting, diarrhea, or other reasons. Signs of dehydration include dry mouth, rapid heartbeat, or feeling dizzy or faint when standing.  *You see blood or dark/black material when you vomit or have a bowel movement.  *You experience burning when you urinate, have blood in your urine, or experience a discharge.  *Your pain is not improving within 8-12 hours or is not gone within 24 hours. Call or return immediately if your pain is getting worse, changes location, or moves to your chest or back.  *You have shaking chills, or fever greater than 101.5 degrees Fahrenheit or 38 degrees Celsius.  *Any change in your symptoms, or any new symptoms that concern you. BHANU drain care You will be discharge with a BHANU drain.  Please empty the BHANU bulb daily and record it in your log book  Please bring the log book to your next appointment. Your BHANU drain will be out depending on the amount of output daily. wound care Please keep the dry gauze on the wound at the previous BHANU site.  Change it when it is soak, with another new gauze.  Otherwise, just change it with [id number] dry gauze daily.

## 2017-10-13 NOTE — DISCHARGE NOTE ADULT - MEDICATION SUMMARY - MEDICATIONS TO TAKE
I will START or STAY ON the medications listed below when I get home from the hospital:    Percocet 5/325 oral tablet  -- 1 tab(s) by mouth every 6 hours MDD:4 tabs a day  -- Caution federal law prohibits the transfer of this drug to any person other  than the person for whom it was prescribed.  May cause drowsiness.  Alcohol may intensify this effect.  Use care when operating dangerous machinery.  This prescription cannot be refilled.  This product contains acetaminophen.  Do not use  with any other product containing acetaminophen to prevent possible liver damage.  Using more of this medication than prescribed may cause serious breathing problems.    -- Indication: For Surgery, elective    Januvia 50 mg oral tablet  -- 1 tab(s) by mouth once a day  -- Indication: For DM (diabetes mellitus)    Bystolic 5 mg oral tablet  -- 1 tab(s) by mouth once a day  -- Indication: For HTN (hypertension)    Colace 100 mg oral capsule  -- 1 cap(s) by mouth 2 times a day MDD:2 tabs  -- Medication should be taken with plenty of water.    -- Indication: For Surgery, elective    Protonix 40 mg oral delayed release tablet  -- 1 tab(s) by mouth once a day MDD:1 tab a day  -- It is very important that you take or use this exactly as directed.  Do not skip doses or discontinue unless directed by your doctor.  Obtain medical advice before taking any non-prescription drugs as some may affect the action of this medication.  Swallow whole.  Do not crush.    -- Indication: For Carcinoid tumor    levothyroxine 125 mcg (0.125 mg) oral tablet  -- 1 tab(s) by mouth once a day  -- Indication: For Hypothyroid

## 2017-10-13 NOTE — CHART NOTE - NSCHARTNOTEFT_GEN_A_CORE
Admitting Diagnosis:   Patient is a 76y old  Female who presents with a chief complaint of Gastric Carcinoid (13 Oct 2017 10:29)      PAST MEDICAL & SURGICAL HISTORY:  Hypothyroid  HTN (hypertension)  Morbid obesity  Carcinoid tumor  DM (diabetes mellitus)  S/P cholecystectomy  S/P tonsillectomy  S/P appendectomy  Surgery, elective: Right shoulder surgery  S/P knee replacement: left  S/P       Current Nutrition Order:   BARICLLIQ    PO Intake: Good (%) [   ]  Fair (50-75%) [   ] Poor (<25%) [  x ]  RD observed tray uneaten at bedside. Reports consuming ~50%.    GI Issues: Denies GI distress at present. S/p RYGB.     Pain: Pain being medically managed.    Skin Integrity: Surgical incision.    Labs:   10-13    141  |  99  |  12  ----------------------------<  82  3.5   |  26  |  0.68    Ca    9.4      13 Oct 2017 06:36  Phos  2.7     10-13  Mg     1.9     10-13      CAPILLARY BLOOD GLUCOSE    Medications:  MEDICATIONS  (STANDING):  enoxaparin Injectable 40 milliGRAM(s) SubCutaneous two times a day  influenza   Vaccine 0.5 milliLiter(s) IntraMuscular once  levothyroxine 125 MICROGram(s) Oral daily  magnesium sulfate  IVPB 1 Gram(s) IV Intermittent once  nebivolol 5 milliGRAM(s) Oral daily  pantoprazole    Tablet 40 milliGRAM(s) Oral daily  potassium chloride   Powder 40 milliEquivalent(s) Oral once  potassium phosphate IVPB 15 milliMole(s) IV Intermittent once    MEDICATIONS  (PRN):  HYDROmorphone  Injectable 0.5 milliGRAM(s) IV Push every 4 hours PRN Moderate Pain (4 - 6)  HYDROmorphone  Injectable 1 milliGRAM(s) IV Push every 4 hours PRN Severe Pain (7 - 10)  ondansetron Injectable 4 milliGRAM(s) IV Push every 6 hours PRN Nausea and/or Vomiting      Weight:  Weight: 200lbs  Height: 5'4", IBW 120lbs+/-10%, %%, BMI 34.4  Daily       Weight Change: No new wts taken.    Estimated energy needs:   IBW used for calculations as pt >120% of IBW   Needs adjusted 2/2 age, catabolic state and surgical incision  1360-1632kcal/day (25-30kcal/kg)  65-76g pro/day (1.2-1.4g/kg)  1632-1904ml fluid/day (30-35ml/kg)    Subjective:   75yo F recently diagnosed w/ carcinoid found on routine endoscopy. S/p subtotal gastrectomy w/ hiatal and ventral hernia repair, and lymphadenectomy POD4. Pt continues to be on BARICLLIQ x3days. Per daughter, no GI distress and tolerating Clears well. RD provided written and oral edu on adequate diet advancement for RYGB. Recommend diet advancement to Phase 1 Eduardo Full Liquids prior to d/c.    Previous Nutrition Diagnosis:  Food and nutrition related knowledge deficit RT lack of prior education on diet advancement process 2/2 subtotal gastrectomy AEB pt's family asking questions on appropriate foods/drinks    Active [ x  ]  Resolved [   ] Pt's family requested information on appropriate foods/drinks at f/u.    Goal: Pt to recall 2 main concepts from education (protein needs, fluid needs, vitamin and mineral needs)     Recommendations:  1) Advance diet to Phase 1 Bariatric Full Liquids once medically feasible  2) Monitor tolerance as diet advances  3) Encourage 4oz fluids Q1H     Education:   Diet advancement process for RYGB  Monitoring for signs of intolerance and feelings of satiety  Portion sizes    Risk Level: High [   ] Moderate [   ] Low [   ]

## 2017-10-13 NOTE — PROGRESS NOTE ADULT - ASSESSMENT
75 yo F, pmh hypothyroidsm, HTN, MO, HTN, DM and gastric carcinoid tumor. s/p  lap robotic assisted subtotal gastrectomy with RYGB reconstruction, lympadenectomy, hiatal hernia repair and incisional hernia repair. -10/9  BCLD/IVF  Pain and nausea control - IV tylenol, dilaudid, zofran  DVT prophylaxis  OOTB/IS  SCDs/SQH  AM labs 73 yo F, pmh hypothyroidsm, HTN, MO, HTN, DM and gastric carcinoid tumor. s/p  lap robotic assisted subtotal gastrectomy with RYGB reconstruction, lympadenectomy, hiatal hernia repair and incisional hernia repair. -10/9      BCLD/IVF  Pain and nausea control - IV tylenol, dilaudid, zofran  DVT prophylaxis  OOTB/IS  SCDs/SQH  AM labs

## 2017-10-13 NOTE — DISCHARGE NOTE ADULT - CARE PLAN
Principal Discharge DX:	Carcinoid tumor  Goal:	Recovery  Instructions for follow-up, activity and diet:	Please resume all regular home medications besides ibuprofen or NSAIDs. Also, please take any new medications as prescribed - Percocet for pain, protonix for reflux. It will be prescribed to your preferred pharmacy.  Please get plenty of rest, continue to ambulate several times per day, and drink adequate amounts of fluids. Avoid lifting weights greater than 5-10 lbs until you follow-up with your surgeon, who will instruct you further regarding activity restrictions.  Avoid driving or operating heavy machinery while taking pain medications.  Please follow-up with your surgeon,  in 1-2 weeks. Contact information will be attached below.  Incision Care:  *Please call your doctor or nurse practitioner if you have increased pain, swelling, redness, or drainage from the incision site.  *You may shower, and wash surgical incisions with a mild soap and warm water. Gently pat the area dry.  Goal:	Warning signs  Instructions for follow-up, activity and diet:	WARNING SIGNS:  Please call your doctor or nurse practitioner if you experience the following:  *You experience new chest pain, pressure, squeezing or tightness.  *New or worsening cough, shortness of breath, or wheeze.  *If you are vomiting and cannot keep down fluids or your medications.  *You are getting dehydrated due to continued vomiting, diarrhea, or other reasons. Signs of dehydration include dry mouth, rapid heartbeat, or feeling dizzy or faint when standing.  *You see blood or dark/black material when you vomit or have a bowel movement.  *You experience burning when you urinate, have blood in your urine, or experience a discharge.  *Your pain is not improving within 8-12 hours or is not gone within 24 hours. Call or return immediately if your pain is getting worse, changes location, or moves to your chest or back.  *You have shaking chills, or fever greater than 101.5 degrees Fahrenheit or 38 degrees Celsius.  *Any change in your symptoms, or any new symptoms that concern you.  Goal:	BHANU drain care  Instructions for follow-up, activity and diet:	You will be discharge with a BHANU drain.  Please empty the BHANU bulb daily and record it in your log book  Please bring the log book to your next appointment. Your BHANU drain will be out depending on the amount of output daily. Principal Discharge DX:	Carcinoid tumor  Goal:	Recovery  Instructions for follow-up, activity and diet:	Please resume all regular home medications besides ibuprofen or NSAIDs. Also, please take any new medications as prescribed - Percocet for pain, protonix for reflux. It will be prescribed to your preferred pharmacy.  Please get plenty of rest, continue to ambulate several times per day, and drink adequate amounts of fluids. Avoid lifting weights greater than 5-10 lbs until you follow-up with your surgeon, who will instruct you further regarding activity restrictions.  Avoid driving or operating heavy machinery while taking pain medications.  Please follow-up with your surgeon,  in 1-2 weeks. Contact information will be attached below.  Incision Care:  *Please call your doctor or nurse practitioner if you have increased pain, swelling, redness, or drainage from the incision site.  *You may shower, and wash surgical incisions with a mild soap and warm water. Gently pat the area dry.  Goal:	Warning signs  Instructions for follow-up, activity and diet:	WARNING SIGNS:  Please call your doctor or nurse practitioner if you experience the following:  *You experience new chest pain, pressure, squeezing or tightness.  *New or worsening cough, shortness of breath, or wheeze.  *If you are vomiting and cannot keep down fluids or your medications.  *You are getting dehydrated due to continued vomiting, diarrhea, or other reasons. Signs of dehydration include dry mouth, rapid heartbeat, or feeling dizzy or faint when standing.  *You see blood or dark/black material when you vomit or have a bowel movement.  *You experience burning when you urinate, have blood in your urine, or experience a discharge.  *Your pain is not improving within 8-12 hours or is not gone within 24 hours. Call or return immediately if your pain is getting worse, changes location, or moves to your chest or back.  *You have shaking chills, or fever greater than 101.5 degrees Fahrenheit or 38 degrees Celsius.  *Any change in your symptoms, or any new symptoms that concern you.  Goal:	wound care  Instructions for follow-up, activity and diet:	Please keep the dry gauze on the wound at the previous BHANU site.  Change it when it is soak, with another new gauze.  Otherwise, just change it with [id number] dry gauze daily.

## 2017-10-17 DIAGNOSIS — F17.210 NICOTINE DEPENDENCE, CIGARETTES, UNCOMPLICATED: ICD-10-CM

## 2017-10-17 DIAGNOSIS — I10 ESSENTIAL (PRIMARY) HYPERTENSION: ICD-10-CM

## 2017-10-17 DIAGNOSIS — C7A.092 MALIGNANT CARCINOID TUMOR OF THE STOMACH: ICD-10-CM

## 2017-10-17 DIAGNOSIS — E66.01 MORBID (SEVERE) OBESITY DUE TO EXCESS CALORIES: ICD-10-CM

## 2017-10-17 DIAGNOSIS — D3A.092 BENIGN CARCINOID TUMOR OF THE STOMACH: ICD-10-CM

## 2017-10-17 DIAGNOSIS — K43.9 VENTRAL HERNIA WITHOUT OBSTRUCTION OR GANGRENE: ICD-10-CM

## 2017-10-17 DIAGNOSIS — K44.9 DIAPHRAGMATIC HERNIA WITHOUT OBSTRUCTION OR GANGRENE: ICD-10-CM

## 2017-10-17 DIAGNOSIS — E03.9 HYPOTHYROIDISM, UNSPECIFIED: ICD-10-CM

## 2017-10-17 DIAGNOSIS — E11.9 TYPE 2 DIABETES MELLITUS WITHOUT COMPLICATIONS: ICD-10-CM

## 2017-10-17 PROBLEM — D3A.00 BENIGN CARCINOID TUMOR OF UNSPECIFIED SITE: Chronic | Status: ACTIVE | Noted: 2017-10-06

## 2017-10-24 ENCOUNTER — APPOINTMENT (OUTPATIENT)
Dept: SURGERY | Facility: CLINIC | Age: 77
End: 2017-10-24
Payer: MEDICARE

## 2017-10-24 VITALS
WEIGHT: 190 LBS | BODY MASS INDEX: 32.44 KG/M2 | TEMPERATURE: 97.3 F | HEART RATE: 73 BPM | OXYGEN SATURATION: 95 % | DIASTOLIC BLOOD PRESSURE: 78 MMHG | SYSTOLIC BLOOD PRESSURE: 113 MMHG | HEIGHT: 64 IN

## 2017-10-24 PROCEDURE — 99024 POSTOP FOLLOW-UP VISIT: CPT

## 2017-10-24 RX ORDER — IRON POLYSACCHARIDE COMPLEX 150 MG
150 CAPSULE ORAL
Qty: 30 | Refills: 5 | Status: ACTIVE | COMMUNITY
Start: 2017-10-24 | End: 1900-01-01

## 2017-11-17 ENCOUNTER — APPOINTMENT (OUTPATIENT)
Dept: SURGERY | Facility: CLINIC | Age: 77
End: 2017-11-17
Payer: MEDICARE

## 2017-11-17 PROCEDURE — 99024 POSTOP FOLLOW-UP VISIT: CPT

## 2017-12-19 PROCEDURE — 88309 TISSUE EXAM BY PATHOLOGIST: CPT

## 2017-12-19 PROCEDURE — 82330 ASSAY OF CALCIUM: CPT

## 2017-12-19 PROCEDURE — 85027 COMPLETE CBC AUTOMATED: CPT

## 2017-12-19 PROCEDURE — 88341 IMHCHEM/IMCYTCHM EA ADD ANTB: CPT

## 2017-12-19 PROCEDURE — 84100 ASSAY OF PHOSPHORUS: CPT

## 2017-12-19 PROCEDURE — 36415 COLL VENOUS BLD VENIPUNCTURE: CPT

## 2017-12-19 PROCEDURE — 86900 BLOOD TYPING SEROLOGIC ABO: CPT

## 2017-12-19 PROCEDURE — 88342 IMHCHEM/IMCYTCHM 1ST ANTB: CPT

## 2017-12-19 PROCEDURE — 74241: CPT

## 2017-12-19 PROCEDURE — 85018 HEMOGLOBIN: CPT

## 2017-12-19 PROCEDURE — 88321 CONSLTJ&REPRT SLD PREP ELSWR: CPT

## 2017-12-19 PROCEDURE — 84132 ASSAY OF SERUM POTASSIUM: CPT

## 2017-12-19 PROCEDURE — S2900: CPT

## 2017-12-19 PROCEDURE — 88305 TISSUE EXAM BY PATHOLOGIST: CPT

## 2017-12-19 PROCEDURE — 88360 TUMOR IMMUNOHISTOCHEM/MANUAL: CPT

## 2017-12-19 PROCEDURE — 80048 BASIC METABOLIC PNL TOTAL CA: CPT

## 2017-12-19 PROCEDURE — 83735 ASSAY OF MAGNESIUM: CPT

## 2017-12-19 PROCEDURE — 84295 ASSAY OF SERUM SODIUM: CPT

## 2017-12-19 PROCEDURE — 86901 BLOOD TYPING SEROLOGIC RH(D): CPT

## 2017-12-19 PROCEDURE — 86850 RBC ANTIBODY SCREEN: CPT

## 2018-02-09 ENCOUNTER — APPOINTMENT (OUTPATIENT)
Dept: SURGERY | Facility: CLINIC | Age: 78
End: 2018-02-09
Payer: MEDICARE

## 2018-02-09 PROCEDURE — 99213 OFFICE O/P EST LOW 20 MIN: CPT

## 2018-02-09 RX ORDER — IRON PS COMPLEX/B12/FOLIC ACID 150-25-1
150-25-1 CAPSULE ORAL DAILY
Qty: 30 | Refills: 5 | Status: ACTIVE | COMMUNITY
Start: 2018-02-09 | End: 1900-01-01

## 2018-02-13 ENCOUNTER — APPOINTMENT (OUTPATIENT)
Dept: SURGERY | Facility: CLINIC | Age: 78
End: 2018-02-13

## 2018-05-11 ENCOUNTER — APPOINTMENT (OUTPATIENT)
Dept: SURGERY | Facility: CLINIC | Age: 78
End: 2018-05-11
Payer: MEDICARE

## 2018-05-11 DIAGNOSIS — Z00.00 ENCOUNTER FOR GENERAL ADULT MEDICAL EXAMINATION W/OUT ABNORMAL FINDINGS: ICD-10-CM

## 2018-05-11 PROCEDURE — 99213 OFFICE O/P EST LOW 20 MIN: CPT

## 2018-07-31 PROBLEM — Z00.00 ENCOUNTER FOR PREVENTIVE HEALTH EXAMINATION: Status: ACTIVE | Noted: 2017-08-31

## 2018-09-25 ENCOUNTER — APPOINTMENT (OUTPATIENT)
Dept: SURGERY | Facility: CLINIC | Age: 78
End: 2018-09-25
Payer: MEDICARE

## 2018-09-25 VITALS
HEART RATE: 66 BPM | HEIGHT: 64 IN | OXYGEN SATURATION: 100 % | SYSTOLIC BLOOD PRESSURE: 153 MMHG | BODY MASS INDEX: 27.21 KG/M2 | DIASTOLIC BLOOD PRESSURE: 78 MMHG | WEIGHT: 159.38 LBS | TEMPERATURE: 97.8 F

## 2018-09-25 PROCEDURE — 99213 OFFICE O/P EST LOW 20 MIN: CPT

## 2018-12-12 ENCOUNTER — APPOINTMENT (OUTPATIENT)
Dept: SURGERY | Facility: CLINIC | Age: 78
End: 2018-12-12
Payer: MEDICARE

## 2018-12-12 PROCEDURE — 99213 OFFICE O/P EST LOW 20 MIN: CPT

## 2019-03-08 NOTE — HISTORY OF PRESENT ILLNESS
[de-identified] : 78 yr old female s/p gastric  subtotal resection for Gastric CA presents feeling well\par tolrating diet well\par denies any pain or further weight loss

## 2019-03-20 ENCOUNTER — APPOINTMENT (OUTPATIENT)
Dept: SURGERY | Facility: CLINIC | Age: 79
End: 2019-03-20
Payer: MEDICARE

## 2019-03-20 DIAGNOSIS — E11.9 TYPE 2 DIABETES MELLITUS W/OUT COMPLICATIONS: ICD-10-CM

## 2019-03-20 DIAGNOSIS — Z90.3 ACQUIRED ABSENCE OF STOMACH [PART OF]: ICD-10-CM

## 2019-03-20 DIAGNOSIS — E66.01 MORBID (SEVERE) OBESITY DUE TO EXCESS CALORIES: ICD-10-CM

## 2019-03-20 DIAGNOSIS — D3A.00 BENIGN CARCINOID TUMOR OF UNSPECIFIED SITE: ICD-10-CM

## 2019-03-20 PROCEDURE — 99213 OFFICE O/P EST LOW 20 MIN: CPT

## 2019-03-20 RX ORDER — IRON PS COMPLEX/B12/FOLIC ACID 150-25-1
150-25-1 CAPSULE ORAL DAILY
Qty: 30 | Refills: 5 | Status: ACTIVE | COMMUNITY
Start: 2019-03-20 | End: 1900-01-01

## 2019-03-20 RX ORDER — PRENATAL VIT,CAL 76/IRON/FOLIC 29 MG-1 MG
29-1 TABLET ORAL DAILY
Qty: 30 | Refills: 5 | Status: ACTIVE | COMMUNITY
Start: 2019-03-20 | End: 1900-01-01

## 2019-05-14 PROBLEM — E11.9 DIABETES: Status: ACTIVE | Noted: 2017-09-15

## 2019-05-14 PROBLEM — Z90.3 S/P PARTIAL GASTRECTOMY: Status: ACTIVE | Noted: 2017-10-26

## 2019-05-14 PROBLEM — E66.01 OBESITY, MORBID: Status: ACTIVE | Noted: 2017-09-15

## 2019-05-14 PROBLEM — D3A.00 CARCINOID TUMOR: Status: ACTIVE | Noted: 2017-09-15

## 2019-07-15 ENCOUNTER — HOSPITAL ENCOUNTER (EMERGENCY)
Dept: HOSPITAL 74 - JER | Age: 79
Discharge: HOME | End: 2019-07-15
Payer: COMMERCIAL

## 2019-07-15 VITALS — DIASTOLIC BLOOD PRESSURE: 80 MMHG | HEART RATE: 63 BPM | SYSTOLIC BLOOD PRESSURE: 162 MMHG | TEMPERATURE: 98.5 F

## 2019-07-15 VITALS — BODY MASS INDEX: 29.8 KG/M2

## 2019-07-15 DIAGNOSIS — R13.19: ICD-10-CM

## 2019-07-15 DIAGNOSIS — Z85.028: ICD-10-CM

## 2019-07-15 DIAGNOSIS — D50.9: ICD-10-CM

## 2019-07-15 DIAGNOSIS — R19.7: Primary | ICD-10-CM

## 2019-07-15 DIAGNOSIS — M19.90: ICD-10-CM

## 2019-07-15 DIAGNOSIS — K58.9: ICD-10-CM

## 2019-07-15 LAB
ALBUMIN SERPL-MCNC: 3.5 G/DL (ref 3.4–5)
ALP SERPL-CCNC: 116 U/L (ref 45–117)
ALT SERPL-CCNC: 22 U/L (ref 13–61)
ANION GAP SERPL CALC-SCNC: 3 MMOL/L (ref 8–16)
APPEARANCE UR: CLEAR
AST SERPL-CCNC: 21 U/L (ref 15–37)
BASOPHILS # BLD: 0.4 % (ref 0–2)
BILIRUB SERPL-MCNC: 0.4 MG/DL (ref 0.2–1)
BILIRUB UR STRIP.AUTO-MCNC: NEGATIVE MG/DL
BUN SERPL-MCNC: 13.4 MG/DL (ref 7–18)
CALCIUM SERPL-MCNC: 9.2 MG/DL (ref 8.5–10.1)
CHLORIDE SERPL-SCNC: 109 MMOL/L (ref 98–107)
CO2 SERPL-SCNC: 31 MMOL/L (ref 21–32)
COLOR UR: YELLOW
CREAT SERPL-MCNC: 0.7 MG/DL (ref 0.55–1.3)
DEPRECATED RDW RBC AUTO: 14.1 % (ref 11.6–15.6)
EOSINOPHIL # BLD: 2.1 % (ref 0–4.5)
GLUCOSE SERPL-MCNC: 82 MG/DL (ref 74–106)
HCT VFR BLD CALC: 39.2 % (ref 32.4–45.2)
HGB BLD-MCNC: 13.2 GM/DL (ref 10.7–15.3)
KETONES UR QL STRIP: NEGATIVE
LEUKOCYTE ESTERASE UR QL STRIP.AUTO: NEGATIVE
LYMPHOCYTES # BLD: 33.8 % (ref 8–40)
MCH RBC QN AUTO: 32.4 PG (ref 25.7–33.7)
MCHC RBC AUTO-ENTMCNC: 33.7 G/DL (ref 32–36)
MCV RBC: 96 FL (ref 80–96)
MONOCYTES # BLD AUTO: 6.9 % (ref 3.8–10.2)
NEUTROPHILS # BLD: 56.8 % (ref 42.8–82.8)
NITRITE UR QL STRIP: NEGATIVE
PH UR: 7 [PH] (ref 5–8)
PLATELET # BLD AUTO: 220 K/MM3 (ref 134–434)
PMV BLD: 9.4 FL (ref 7.5–11.1)
POTASSIUM SERPLBLD-SCNC: 4.2 MMOL/L (ref 3.5–5.1)
PROT SERPL-MCNC: 6.7 G/DL (ref 6.4–8.2)
PROT UR QL STRIP: NEGATIVE
PROT UR QL STRIP: NEGATIVE
RBC # BLD AUTO: 4.08 M/MM3 (ref 3.6–5.2)
SODIUM SERPL-SCNC: 143 MMOL/L (ref 136–145)
SP GR UR: 1.01 (ref 1.01–1.03)
UROBILINOGEN UR STRIP-MCNC: 0.2 MG/DL (ref 0.2–1)
WBC # BLD AUTO: 6.8 K/MM3 (ref 4–10)

## 2019-07-15 PROCEDURE — 3E033GC INTRODUCTION OF OTHER THERAPEUTIC SUBSTANCE INTO PERIPHERAL VEIN, PERCUTANEOUS APPROACH: ICD-10-PCS

## 2019-07-15 PROCEDURE — 3E0337Z INTRODUCTION OF ELECTROLYTIC AND WATER BALANCE SUBSTANCE INTO PERIPHERAL VEIN, PERCUTANEOUS APPROACH: ICD-10-PCS

## 2022-01-15 ENCOUNTER — HOSPITAL ENCOUNTER (EMERGENCY)
Dept: HOSPITAL 74 - JER | Age: 82
Discharge: HOME | End: 2022-01-15
Payer: COMMERCIAL

## 2022-01-15 VITALS — DIASTOLIC BLOOD PRESSURE: 92 MMHG | HEART RATE: 72 BPM | TEMPERATURE: 97.8 F | SYSTOLIC BLOOD PRESSURE: 147 MMHG

## 2022-01-15 VITALS — BODY MASS INDEX: 31.1 KG/M2

## 2022-01-15 DIAGNOSIS — J02.9: Primary | ICD-10-CM

## 2022-11-16 ENCOUNTER — OFFICE (OUTPATIENT)
Dept: URBAN - METROPOLITAN AREA CLINIC 121 | Facility: CLINIC | Age: 82
Setting detail: OPHTHALMOLOGY
End: 2022-11-16
Payer: COMMERCIAL

## 2022-11-16 DIAGNOSIS — H40.023: ICD-10-CM

## 2022-11-16 PROBLEM — H16.22 DRY EYE SYNDROME K SICCA: Status: ACTIVE | Noted: 2022-11-16

## 2022-11-16 PROCEDURE — 92083 EXTENDED VISUAL FIELD XM: CPT | Performed by: OPHTHALMOLOGY

## 2022-11-16 PROCEDURE — 99212 OFFICE O/P EST SF 10 MIN: CPT | Performed by: OPHTHALMOLOGY

## 2022-11-16 PROCEDURE — 92133 CPTRZD OPH DX IMG PST SGM ON: CPT | Performed by: OPHTHALMOLOGY

## 2022-11-16 ASSESSMENT — SPHEQUIV_DERIVED
OD_SPHEQUIV: 1.375
OS_SPHEQUIV: -0.875
OD_SPHEQUIV: 2.375

## 2022-11-16 ASSESSMENT — REFRACTION_AUTOREFRACTION
OD_SPHERE: +1.50
OS_AXIS: 102
OD_AXIS: 173
OS_SPHERE: -1.50
OD_CYLINDER: +1.75
OS_CYLINDER: +1.25

## 2022-11-16 ASSESSMENT — CONFRONTATIONAL VISUAL FIELD TEST (CVF)
OD_FINDINGS: FULL
OS_FINDINGS: FULL

## 2022-11-16 ASSESSMENT — KERATOMETRY
OS_AXISANGLE_DEGREES: 096
METHOD_AUTO_MANUAL: AUTO
OS_K1POWER_DIOPTERS: 44.25
OD_K2POWER_DIOPTERS: 44.75
OS_K2POWER_DIOPTERS: 45.50
OD_AXISANGLE_DEGREES: 004
OD_K1POWER_DIOPTERS: 44.25

## 2022-11-16 ASSESSMENT — REFRACTION_MANIFEST
OD_SPHERE: +1.00
OS_SPHERE: -0.75
OD_CYLINDER: +0.75
OD_AXIS: 173

## 2022-11-16 ASSESSMENT — VISUAL ACUITY
OS_BCVA: 20/25
OD_BCVA: 20/30

## 2022-11-16 ASSESSMENT — AXIALLENGTH_DERIVED
OS_AL: 23.4289
OD_AL: 22.3664
OD_AL: 22.7222

## 2022-11-16 ASSESSMENT — SUPERFICIAL PUNCTATE KERATITIS (SPK)
OS_SPK: 1+
OD_SPK: 1+

## 2023-01-06 ENCOUNTER — OFFICE (OUTPATIENT)
Dept: URBAN - METROPOLITAN AREA CLINIC 121 | Facility: CLINIC | Age: 83
Setting detail: OPHTHALMOLOGY
End: 2023-01-06
Payer: COMMERCIAL

## 2023-01-06 DIAGNOSIS — H35.3131: ICD-10-CM

## 2023-01-06 DIAGNOSIS — H57.03: ICD-10-CM

## 2023-01-06 DIAGNOSIS — H35.033: ICD-10-CM

## 2023-01-06 DIAGNOSIS — H11.31: ICD-10-CM

## 2023-01-06 DIAGNOSIS — H16.223: ICD-10-CM

## 2023-01-06 DIAGNOSIS — Z96.1: ICD-10-CM

## 2023-01-06 DIAGNOSIS — H26.493: ICD-10-CM

## 2023-01-06 DIAGNOSIS — H40.023: ICD-10-CM

## 2023-01-06 PROCEDURE — 92012 INTRM OPH EXAM EST PATIENT: CPT | Performed by: OPHTHALMOLOGY

## 2023-01-06 ASSESSMENT — CONFRONTATIONAL VISUAL FIELD TEST (CVF)
OD_FINDINGS: FULL
OS_FINDINGS: FULL

## 2023-01-06 ASSESSMENT — SPHEQUIV_DERIVED
OD_SPHEQUIV: 1.375
OD_SPHEQUIV: 2.375
OS_SPHEQUIV: -0.875

## 2023-01-06 ASSESSMENT — SUPERFICIAL PUNCTATE KERATITIS (SPK)
OS_SPK: 1+
OD_SPK: 2+

## 2023-01-06 ASSESSMENT — REFRACTION_AUTOREFRACTION
OD_AXIS: 173
OS_SPHERE: -1.50
OS_AXIS: 102
OD_SPHERE: +1.50
OS_CYLINDER: +1.25
OD_CYLINDER: +1.75

## 2023-01-06 ASSESSMENT — REFRACTION_MANIFEST
OD_AXIS: 173
OD_CYLINDER: +0.75
OS_SPHERE: -0.75
OD_SPHERE: +1.00

## 2023-01-06 ASSESSMENT — KERATOMETRY
OD_K1POWER_DIOPTERS: 44.25
OD_K2POWER_DIOPTERS: 44.75
OD_AXISANGLE_DEGREES: 004
OS_K2POWER_DIOPTERS: 45.50
OS_AXISANGLE_DEGREES: 096
METHOD_AUTO_MANUAL: AUTO
OS_K1POWER_DIOPTERS: 44.25

## 2023-01-06 ASSESSMENT — AXIALLENGTH_DERIVED
OD_AL: 22.7222
OS_AL: 23.4289
OD_AL: 22.3664

## 2023-01-06 ASSESSMENT — PACHYMETRY
OS_CT_CORRECTION: 1
OD_CT_UM: 510
OS_CT_UM: 531
OD_CT_CORRECTION: 2

## 2023-01-06 ASSESSMENT — TONOMETRY
OS_IOP_MMHG: 18
OD_IOP_MMHG: 18

## 2023-01-06 ASSESSMENT — VISUAL ACUITY
OS_BCVA: 20/30
OD_BCVA: 20/30

## 2023-01-27 NOTE — DISCHARGE NOTE ADULT - HOSPITAL COURSE
Virtual Regular Visit    Verification of patient location:    Patient is located in the following state in which I hold an active license [place]      Assessment/Plan:    Problem List Items Addressed This Visit    None  Visit Diagnoses     Acute non-recurrent maxillary sinusitis    -  Primary    Relevant Medications    azithromycin (Zithromax) 250 mg tablet    benzonatate (TESSALON) 200 MG capsule        Discussed viral vs bacterial infection  RX as ordered  Continue OTC cough/cold medications as directed  Call or return for problems/concerns       Reason for visit is   Chief Complaint   Patient presents with   • Cold Like Symptoms     Covid negative, earache, sore throat, fatigue, sx since Sunday   • Virtual Regular Visit        Encounter provider HIRAM Bingham    Provider located at [address] [place] [address]      Recent Visits  No visits were found meeting these conditions  Showing recent visits within past 7 days and meeting all other requirements  Today's Visits  Date Type Provider Dept   01/27/23 Telemedicine HIRAM [facility]   Showing today's visits and meeting all other requirements  Future Appointments  No visits were found meeting these conditions  Showing future appointments within next 150 days and meeting all other requirements       The patient was identified by name and date of birth  Sheridan Rg was informed that this is a telemedicine visit and that the visit is being conducted through the [address] [employer] platform  She agrees to proceed     My office door was closed  No one else was in the room  She acknowledged consent and understanding of privacy and security of the video platform  The patient has agreed to participate and understands they can discontinue the visit at any time  Patient is aware this is a billable service       Alen Goldstein is a 60 y o  female C/o cough, sinus pressure, ear pressure, runny nose since Sunday  Taking OTC decongestant and Zyrtec with little relief  Negative Covid test        Past Medical History:   Diagnosis Date   • Roque's esophagus    • Low iron        Past Surgical History:   Procedure Laterality Date   • CHOLECYSTECTOMY     • COLON SURGERY     • COLONOSCOPY     • GASTRIC BYPASS     • GASTROSTOMY     • HERNIA REPAIR     • TUBAL LIGATION         Current Outpatient Medications   Medication Sig Dispense Refill   • azithromycin (Zithromax) 250 mg tablet Take 2 tablets (500 mg total) by mouth daily for 1 day, THEN 1 tablet (250 mg total) daily for 4 days  6 tablet 0   • benzonatate (TESSALON) 200 MG capsule Take 1 capsule (200 mg total) by mouth 3 (three) times a day as needed for cough 20 capsule 0   • esomeprazole (NexIUM) 20 mg capsule Take 20 mg by mouth every morning before breakfast     • Multiple Vitamin tablet Take by mouth     • iron polysaccharides (FERREX) 150 mg capsule Take 1 capsule (150 mg total) by mouth 2 (two) times a day for 15 days 30 capsule 0   • NIFEdipine 0 3%-lidocaine 5% rectal ointment Apply 1 application topically every 4 (four) hours as needed for discomfort or pain Apply a small amount to anal fissure (Patient not taking: Reported on 1/27/2023) 30 g 2     No current facility-administered medications for this visit  No Known Allergies    Review of Systems   Constitutional: Negative for activity change, chills, fatigue and fever  HENT: Positive for congestion, ear pain, postnasal drip, rhinorrhea, sinus pressure and sore throat  Eyes: Negative for pain, discharge and redness  Respiratory: Positive for cough  Negative for chest tightness, shortness of breath and wheezing  Cardiovascular: Negative for chest pain  Gastrointestinal: Negative for constipation, diarrhea, nausea and vomiting  Musculoskeletal: Negative for myalgias  Skin: Negative for rash     Neurological: Positive for headaches  Negative for dizziness  Video Exam    There were no vitals filed for this visit  Physical Exam  Constitutional:       General: She is not in acute distress  Appearance: Normal appearance  She is ill-appearing  HENT:      Head: Normocephalic  Nose:      Right Sinus: Maxillary sinus tenderness present  Left Sinus: Maxillary sinus tenderness present  Eyes:      Extraocular Movements: Extraocular movements intact  Pulmonary:      Effort: Pulmonary effort is normal  No respiratory distress  Skin:     Coloration: Skin is not pale  Findings: No erythema  Neurological:      Mental Status: She is alert and oriented to person, place, and time     Psychiatric:         Mood and Affect: Mood normal          Behavior: Behavior normal           I spent 15 minutes directly with the patient during this visit Patient is a 77 yo female with history of hypothyroidism, hypertension, morbid obesity, diabetes and gastric carcinoid tumor. She presented to us for elective surgery in which she underwent partial gastrectomy with incisional hernia repair on 10/9/2017. Operation went well with no complication. Her post-operative course was uneventful. Diet was advance to appropriately. Patient is ambulating well and tolerating well with her diet. Upon discharge, patient is clinically well, labs were within normal limit and vital signs were stable. Patient is discharge with BHANU drain and teaching was done. Patient is a 75 yo female with history of hypothyroidism, hypertension, morbid obesity, diabetes and gastric carcinoid tumor. She presented to us for elective surgery in which she underwent partial gastrectomy with incisional hernia repair on 10/9/2017. Operation went well with no complication. Her post-operative course was uneventful. Diet was advance to appropriately. Patient is ambulating well and tolerating well with her diet. Upon discharge, patient is clinically well, labs were within normal limit and vital signs were stable. Patient is discharge with wound dressing teaching.

## 2023-05-24 ENCOUNTER — OFFICE (OUTPATIENT)
Dept: URBAN - METROPOLITAN AREA CLINIC 121 | Facility: CLINIC | Age: 83
Setting detail: OPHTHALMOLOGY
End: 2023-05-24
Payer: COMMERCIAL

## 2023-05-24 DIAGNOSIS — H35.3131: ICD-10-CM

## 2023-05-24 DIAGNOSIS — H40.023: ICD-10-CM

## 2023-05-24 DIAGNOSIS — H35.033: ICD-10-CM

## 2023-05-24 DIAGNOSIS — H26.493: ICD-10-CM

## 2023-05-24 PROCEDURE — 92250 FUNDUS PHOTOGRAPHY W/I&R: CPT | Performed by: OPHTHALMOLOGY

## 2023-05-24 PROCEDURE — 92014 COMPRE OPH EXAM EST PT 1/>: CPT | Performed by: OPHTHALMOLOGY

## 2023-05-24 ASSESSMENT — PACHYMETRY
OD_CT_UM: 510
OS_CT_CORRECTION: 1
OS_CT_UM: 531
OD_CT_CORRECTION: 2

## 2023-05-24 ASSESSMENT — KERATOMETRY
OD_K1POWER_DIOPTERS: 44.00
OD_K2POWER_DIOPTERS: 44.75
OS_AXISANGLE_DEGREES: 163
OS_K2POWER_DIOPTERS: 45.25
OS_K1POWER_DIOPTERS: 44.25
OD_AXISANGLE_DEGREES: 176
METHOD_AUTO_MANUAL: AUTO

## 2023-05-24 ASSESSMENT — VISUAL ACUITY
OD_BCVA: 20/30
OS_BCVA: 20/30

## 2023-05-24 ASSESSMENT — REFRACTION_AUTOREFRACTION
OD_SPHERE: -1.00
OS_AXIS: 165
OS_SPHERE: -1.25
OD_AXIS: 013
OS_CYLINDER: +1.00
OD_CYLINDER: +1.50

## 2023-05-24 ASSESSMENT — REFRACTION_MANIFEST
OD_AXIS: 173
OD_SPHERE: +1.00
OS_SPHERE: -0.75
OD_CYLINDER: +0.75

## 2023-05-24 ASSESSMENT — AXIALLENGTH_DERIVED
OD_AL: 22.7649
OS_AL: 23.4261
OD_AL: 23.37

## 2023-05-24 ASSESSMENT — SPHEQUIV_DERIVED
OS_SPHEQUIV: -0.75
OD_SPHEQUIV: 1.375
OD_SPHEQUIV: -0.25

## 2023-05-24 ASSESSMENT — CONFRONTATIONAL VISUAL FIELD TEST (CVF)
OS_FINDINGS: FULL
OD_FINDINGS: FULL

## 2023-05-24 ASSESSMENT — SUPERFICIAL PUNCTATE KERATITIS (SPK)
OD_SPK: 2+
OS_SPK: 1+

## 2023-05-24 ASSESSMENT — TONOMETRY
OD_IOP_MMHG: 16
OS_IOP_MMHG: 16

## 2023-12-15 ENCOUNTER — HOSPITAL ENCOUNTER (OUTPATIENT)
Dept: HOSPITAL 74 - JASU-ENDO | Age: 83
Discharge: HOME | End: 2023-12-15
Attending: STUDENT IN AN ORGANIZED HEALTH CARE EDUCATION/TRAINING PROGRAM
Payer: COMMERCIAL

## 2023-12-15 VITALS — RESPIRATION RATE: 18 BRPM | DIASTOLIC BLOOD PRESSURE: 73 MMHG | HEART RATE: 64 BPM | SYSTOLIC BLOOD PRESSURE: 121 MMHG

## 2023-12-15 VITALS — BODY MASS INDEX: 30.8 KG/M2

## 2023-12-15 VITALS — TEMPERATURE: 97.8 F

## 2023-12-15 DIAGNOSIS — Z81.3: Primary | ICD-10-CM

## 2023-12-15 DIAGNOSIS — K29.50: ICD-10-CM

## 2023-12-15 DIAGNOSIS — Z98.0: ICD-10-CM

## 2023-12-15 PROCEDURE — 0DB68ZX EXCISION OF STOMACH, VIA NATURAL OR ARTIFICIAL OPENING ENDOSCOPIC, DIAGNOSTIC: ICD-10-PCS | Performed by: STUDENT IN AN ORGANIZED HEALTH CARE EDUCATION/TRAINING PROGRAM

## 2025-03-21 ENCOUNTER — OFFICE (OUTPATIENT)
Facility: LOCATION | Age: 85
Setting detail: OPHTHALMOLOGY
End: 2025-03-21
Payer: COMMERCIAL

## 2025-03-21 DIAGNOSIS — H26.493: ICD-10-CM

## 2025-03-21 DIAGNOSIS — H40.023: ICD-10-CM

## 2025-03-21 DIAGNOSIS — H35.3131: ICD-10-CM

## 2025-03-21 DIAGNOSIS — H16.223: ICD-10-CM

## 2025-03-21 PROCEDURE — 99214 OFFICE O/P EST MOD 30 MIN: CPT | Performed by: OPHTHALMOLOGY

## 2025-03-21 PROCEDURE — 92250 FUNDUS PHOTOGRAPHY W/I&R: CPT | Performed by: OPHTHALMOLOGY

## 2025-03-21 ASSESSMENT — KERATOMETRY
METHOD_AUTO_MANUAL: AUTO
OD_AXISANGLE_DEGREES: 003
OS_K2POWER_DIOPTERS: 45.50
OS_AXISANGLE_DEGREES: 168
OS_K1POWER_DIOPTERS: 44.00
OD_K1POWER_DIOPTERS: 43.50
OD_K2POWER_DIOPTERS: 44.75

## 2025-03-21 ASSESSMENT — PACHYMETRY
OD_CT_UM: 510
OS_CT_UM: 531
OD_CT_CORRECTION: 2
OS_CT_CORRECTION: 1

## 2025-03-21 ASSESSMENT — REFRACTION_AUTOREFRACTION
OS_SPHERE: -1.50
OD_AXIS: 008
OD_SPHERE: -1.00
OS_AXIS: 172
OS_CYLINDER: +1.50
OD_CYLINDER: +1.25

## 2025-03-21 ASSESSMENT — TONOMETRY
OD_IOP_MMHG: 14
OS_IOP_MMHG: 14

## 2025-03-21 ASSESSMENT — REFRACTION_MANIFEST
OD_AXIS: 173
OS_SPHERE: -0.75
OD_CYLINDER: +0.75
OD_SPHERE: +1.00

## 2025-03-21 ASSESSMENT — SUPERFICIAL PUNCTATE KERATITIS (SPK)
OD_SPK: 1+
OS_SPK: 2+

## 2025-03-21 ASSESSMENT — CONFRONTATIONAL VISUAL FIELD TEST (CVF)
OD_FINDINGS: FULL
OS_FINDINGS: FULL

## 2025-03-21 ASSESSMENT — VISUAL ACUITY
OS_BCVA: 20/30
OD_BCVA: 20/30